# Patient Record
Sex: FEMALE | Race: OTHER | HISPANIC OR LATINO | URBAN - METROPOLITAN AREA
[De-identification: names, ages, dates, MRNs, and addresses within clinical notes are randomized per-mention and may not be internally consistent; named-entity substitution may affect disease eponyms.]

---

## 2019-10-13 ENCOUNTER — INPATIENT (INPATIENT)
Facility: HOSPITAL | Age: 64
LOS: 2 days | Discharge: HOME | DRG: 392 | End: 2019-10-16
Attending: INTERNAL MEDICINE | Admitting: INTERNAL MEDICINE
Payer: COMMERCIAL

## 2019-10-13 VITALS
SYSTOLIC BLOOD PRESSURE: 156 MMHG | HEART RATE: 91 BPM | WEIGHT: 139.99 LBS | RESPIRATION RATE: 20 BRPM | TEMPERATURE: 98 F | HEIGHT: 62 IN | OXYGEN SATURATION: 97 % | DIASTOLIC BLOOD PRESSURE: 85 MMHG

## 2019-10-13 DIAGNOSIS — J98.11 ATELECTASIS: ICD-10-CM

## 2019-10-13 DIAGNOSIS — E03.9 HYPOTHYROIDISM, UNSPECIFIED: ICD-10-CM

## 2019-10-13 DIAGNOSIS — E78.5 HYPERLIPIDEMIA, UNSPECIFIED: ICD-10-CM

## 2019-10-13 DIAGNOSIS — Z98.51 TUBAL LIGATION STATUS: Chronic | ICD-10-CM

## 2019-10-13 DIAGNOSIS — K57.92 DIVERTICULITIS OF INTESTINE, PART UNSPECIFIED, WITHOUT PERFORATION OR ABSCESS WITHOUT BLEEDING: ICD-10-CM

## 2019-10-13 DIAGNOSIS — E11.9 TYPE 2 DIABETES MELLITUS WITHOUT COMPLICATIONS: ICD-10-CM

## 2019-10-13 DIAGNOSIS — Z29.9 ENCOUNTER FOR PROPHYLACTIC MEASURES, UNSPECIFIED: ICD-10-CM

## 2019-10-13 DIAGNOSIS — I10 ESSENTIAL (PRIMARY) HYPERTENSION: ICD-10-CM

## 2019-10-13 DIAGNOSIS — Z41.1 ENCOUNTER FOR COSMETIC SURGERY: Chronic | ICD-10-CM

## 2019-10-13 LAB
ALBUMIN SERPL ELPH-MCNC: 3.6 G/DL — SIGNIFICANT CHANGE UP (ref 3.5–5)
ALP SERPL-CCNC: 102 U/L — SIGNIFICANT CHANGE UP (ref 40–120)
ALT FLD-CCNC: 26 U/L DA — SIGNIFICANT CHANGE UP (ref 10–60)
ANION GAP SERPL CALC-SCNC: 6 MMOL/L — SIGNIFICANT CHANGE UP (ref 5–17)
APPEARANCE UR: CLEAR — SIGNIFICANT CHANGE UP
AST SERPL-CCNC: 23 U/L — SIGNIFICANT CHANGE UP (ref 10–40)
BASOPHILS # BLD AUTO: 0.05 K/UL — SIGNIFICANT CHANGE UP (ref 0–0.2)
BASOPHILS NFR BLD AUTO: 0.4 % — SIGNIFICANT CHANGE UP (ref 0–2)
BILIRUB SERPL-MCNC: 0.5 MG/DL — SIGNIFICANT CHANGE UP (ref 0.2–1.2)
BILIRUB UR-MCNC: NEGATIVE — SIGNIFICANT CHANGE UP
BUN SERPL-MCNC: 11 MG/DL — SIGNIFICANT CHANGE UP (ref 7–18)
CALCIUM SERPL-MCNC: 9.1 MG/DL — SIGNIFICANT CHANGE UP (ref 8.4–10.5)
CHLORIDE SERPL-SCNC: 102 MMOL/L — SIGNIFICANT CHANGE UP (ref 96–108)
CO2 SERPL-SCNC: 27 MMOL/L — SIGNIFICANT CHANGE UP (ref 22–31)
COLOR SPEC: YELLOW — SIGNIFICANT CHANGE UP
CREAT SERPL-MCNC: 0.84 MG/DL — SIGNIFICANT CHANGE UP (ref 0.5–1.3)
DIFF PNL FLD: ABNORMAL
EOSINOPHIL # BLD AUTO: 0.04 K/UL — SIGNIFICANT CHANGE UP (ref 0–0.5)
EOSINOPHIL NFR BLD AUTO: 0.3 % — SIGNIFICANT CHANGE UP (ref 0–6)
GLUCOSE BLDC GLUCOMTR-MCNC: 108 MG/DL — HIGH (ref 70–99)
GLUCOSE BLDC GLUCOMTR-MCNC: 109 MG/DL — HIGH (ref 70–99)
GLUCOSE BLDC GLUCOMTR-MCNC: 128 MG/DL — HIGH (ref 70–99)
GLUCOSE SERPL-MCNC: 135 MG/DL — HIGH (ref 70–99)
GLUCOSE UR QL: NEGATIVE — SIGNIFICANT CHANGE UP
HCT VFR BLD CALC: 42.8 % — SIGNIFICANT CHANGE UP (ref 34.5–45)
HGB BLD-MCNC: 13.9 G/DL — SIGNIFICANT CHANGE UP (ref 11.5–15.5)
IMM GRANULOCYTES NFR BLD AUTO: 0.4 % — SIGNIFICANT CHANGE UP (ref 0–1.5)
KETONES UR-MCNC: NEGATIVE — SIGNIFICANT CHANGE UP
LEUKOCYTE ESTERASE UR-ACNC: ABNORMAL
LIDOCAIN IGE QN: 124 U/L — SIGNIFICANT CHANGE UP (ref 73–393)
LYMPHOCYTES # BLD AUTO: 0.98 K/UL — LOW (ref 1–3.3)
LYMPHOCYTES # BLD AUTO: 8.1 % — LOW (ref 13–44)
MCHC RBC-ENTMCNC: 28.4 PG — SIGNIFICANT CHANGE UP (ref 27–34)
MCHC RBC-ENTMCNC: 32.5 GM/DL — SIGNIFICANT CHANGE UP (ref 32–36)
MCV RBC AUTO: 87.3 FL — SIGNIFICANT CHANGE UP (ref 80–100)
MONOCYTES # BLD AUTO: 0.67 K/UL — SIGNIFICANT CHANGE UP (ref 0–0.9)
MONOCYTES NFR BLD AUTO: 5.6 % — SIGNIFICANT CHANGE UP (ref 2–14)
NEUTROPHILS # BLD AUTO: 10.25 K/UL — HIGH (ref 1.8–7.4)
NEUTROPHILS NFR BLD AUTO: 85.2 % — HIGH (ref 43–77)
NITRITE UR-MCNC: NEGATIVE — SIGNIFICANT CHANGE UP
NRBC # BLD: 0 /100 WBCS — SIGNIFICANT CHANGE UP (ref 0–0)
PH UR: 7 — SIGNIFICANT CHANGE UP (ref 5–8)
PLATELET # BLD AUTO: 400 K/UL — SIGNIFICANT CHANGE UP (ref 150–400)
POTASSIUM SERPL-MCNC: 4.3 MMOL/L — SIGNIFICANT CHANGE UP (ref 3.5–5.3)
POTASSIUM SERPL-SCNC: 4.3 MMOL/L — SIGNIFICANT CHANGE UP (ref 3.5–5.3)
PROT SERPL-MCNC: 8.3 G/DL — SIGNIFICANT CHANGE UP (ref 6–8.3)
PROT UR-MCNC: NEGATIVE — SIGNIFICANT CHANGE UP
RBC # BLD: 4.9 M/UL — SIGNIFICANT CHANGE UP (ref 3.8–5.2)
RBC # FLD: 12.5 % — SIGNIFICANT CHANGE UP (ref 10.3–14.5)
SODIUM SERPL-SCNC: 135 MMOL/L — SIGNIFICANT CHANGE UP (ref 135–145)
SP GR SPEC: 1 — LOW (ref 1.01–1.02)
UROBILINOGEN FLD QL: NEGATIVE — SIGNIFICANT CHANGE UP
WBC # BLD: 12.04 K/UL — HIGH (ref 3.8–10.5)
WBC # FLD AUTO: 12.04 K/UL — HIGH (ref 3.8–10.5)

## 2019-10-13 PROCEDURE — 74177 CT ABD & PELVIS W/CONTRAST: CPT | Mod: 26

## 2019-10-13 PROCEDURE — 99285 EMERGENCY DEPT VISIT HI MDM: CPT

## 2019-10-13 RX ORDER — METRONIDAZOLE 500 MG
500 TABLET ORAL ONCE
Refills: 0 | Status: COMPLETED | OUTPATIENT
Start: 2019-10-13 | End: 2019-10-13

## 2019-10-13 RX ORDER — ONDANSETRON 8 MG/1
4 TABLET, FILM COATED ORAL ONCE
Refills: 0 | Status: COMPLETED | OUTPATIENT
Start: 2019-10-13 | End: 2019-10-13

## 2019-10-13 RX ORDER — SODIUM CHLORIDE 9 MG/ML
1000 INJECTION, SOLUTION INTRAVENOUS
Refills: 0 | Status: DISCONTINUED | OUTPATIENT
Start: 2019-10-13 | End: 2019-10-16

## 2019-10-13 RX ORDER — KETOROLAC TROMETHAMINE 30 MG/ML
15 SYRINGE (ML) INJECTION EVERY 8 HOURS
Refills: 0 | Status: DISCONTINUED | OUTPATIENT
Start: 2019-10-13 | End: 2019-10-16

## 2019-10-13 RX ORDER — INSULIN LISPRO 100/ML
VIAL (ML) SUBCUTANEOUS
Refills: 0 | Status: DISCONTINUED | OUTPATIENT
Start: 2019-10-13 | End: 2019-10-16

## 2019-10-13 RX ORDER — ONDANSETRON 8 MG/1
4 TABLET, FILM COATED ORAL EVERY 6 HOURS
Refills: 0 | Status: DISCONTINUED | OUTPATIENT
Start: 2019-10-13 | End: 2019-10-16

## 2019-10-13 RX ORDER — LOSARTAN POTASSIUM 100 MG/1
50 TABLET, FILM COATED ORAL DAILY
Refills: 0 | Status: DISCONTINUED | OUTPATIENT
Start: 2019-10-13 | End: 2019-10-16

## 2019-10-13 RX ORDER — ENOXAPARIN SODIUM 100 MG/ML
40 INJECTION SUBCUTANEOUS DAILY
Refills: 0 | Status: DISCONTINUED | OUTPATIENT
Start: 2019-10-13 | End: 2019-10-16

## 2019-10-13 RX ORDER — ATORVASTATIN CALCIUM 80 MG/1
20 TABLET, FILM COATED ORAL AT BEDTIME
Refills: 0 | Status: DISCONTINUED | OUTPATIENT
Start: 2019-10-13 | End: 2019-10-16

## 2019-10-13 RX ORDER — IOHEXOL 300 MG/ML
30 INJECTION, SOLUTION INTRAVENOUS ONCE
Refills: 0 | Status: COMPLETED | OUTPATIENT
Start: 2019-10-13 | End: 2019-10-13

## 2019-10-13 RX ORDER — LEVOTHYROXINE SODIUM 125 MCG
1 TABLET ORAL
Qty: 0 | Refills: 0 | DISCHARGE

## 2019-10-13 RX ORDER — CIPROFLOXACIN LACTATE 400MG/40ML
VIAL (ML) INTRAVENOUS
Refills: 0 | Status: DISCONTINUED | OUTPATIENT
Start: 2019-10-13 | End: 2019-10-16

## 2019-10-13 RX ORDER — METRONIDAZOLE 500 MG
TABLET ORAL
Refills: 0 | Status: DISCONTINUED | OUTPATIENT
Start: 2019-10-13 | End: 2019-10-16

## 2019-10-13 RX ORDER — LEVOTHYROXINE SODIUM 125 MCG
100 TABLET ORAL DAILY
Refills: 0 | Status: DISCONTINUED | OUTPATIENT
Start: 2019-10-13 | End: 2019-10-16

## 2019-10-13 RX ORDER — MORPHINE SULFATE 50 MG/1
4 CAPSULE, EXTENDED RELEASE ORAL ONCE
Refills: 0 | Status: DISCONTINUED | OUTPATIENT
Start: 2019-10-13 | End: 2019-10-13

## 2019-10-13 RX ORDER — DEXTROSE 50 % IN WATER 50 %
25 SYRINGE (ML) INTRAVENOUS ONCE
Refills: 0 | Status: DISCONTINUED | OUTPATIENT
Start: 2019-10-13 | End: 2019-10-16

## 2019-10-13 RX ORDER — SODIUM CHLORIDE 9 MG/ML
1000 INJECTION INTRAMUSCULAR; INTRAVENOUS; SUBCUTANEOUS ONCE
Refills: 0 | Status: COMPLETED | OUTPATIENT
Start: 2019-10-13 | End: 2019-10-13

## 2019-10-13 RX ORDER — PIPERACILLIN AND TAZOBACTAM 4; .5 G/20ML; G/20ML
3.38 INJECTION, POWDER, LYOPHILIZED, FOR SOLUTION INTRAVENOUS ONCE
Refills: 0 | Status: COMPLETED | OUTPATIENT
Start: 2019-10-13 | End: 2019-10-13

## 2019-10-13 RX ORDER — CIPROFLOXACIN LACTATE 400MG/40ML
400 VIAL (ML) INTRAVENOUS ONCE
Refills: 0 | Status: COMPLETED | OUTPATIENT
Start: 2019-10-13 | End: 2019-10-13

## 2019-10-13 RX ORDER — METRONIDAZOLE 500 MG
500 TABLET ORAL EVERY 8 HOURS
Refills: 0 | Status: DISCONTINUED | OUTPATIENT
Start: 2019-10-13 | End: 2019-10-16

## 2019-10-13 RX ORDER — CIPROFLOXACIN LACTATE 400MG/40ML
400 VIAL (ML) INTRAVENOUS EVERY 12 HOURS
Refills: 0 | Status: DISCONTINUED | OUTPATIENT
Start: 2019-10-14 | End: 2019-10-16

## 2019-10-13 RX ORDER — LEVOTHYROXINE SODIUM 125 MCG
75 TABLET ORAL DAILY
Refills: 0 | Status: DISCONTINUED | OUTPATIENT
Start: 2019-10-13 | End: 2019-10-13

## 2019-10-13 RX ORDER — ACETAMINOPHEN 500 MG
650 TABLET ORAL EVERY 6 HOURS
Refills: 0 | Status: DISCONTINUED | OUTPATIENT
Start: 2019-10-13 | End: 2019-10-16

## 2019-10-13 RX ADMIN — IOHEXOL 30 MILLILITER(S): 300 INJECTION, SOLUTION INTRAVENOUS at 08:39

## 2019-10-13 RX ADMIN — Medication 15 MILLIGRAM(S): at 21:03

## 2019-10-13 RX ADMIN — LOSARTAN POTASSIUM 50 MILLIGRAM(S): 100 TABLET, FILM COATED ORAL at 15:49

## 2019-10-13 RX ADMIN — Medication 100 MILLIGRAM(S): at 21:53

## 2019-10-13 RX ADMIN — Medication 100 MILLIGRAM(S): at 14:37

## 2019-10-13 RX ADMIN — MORPHINE SULFATE 4 MILLIGRAM(S): 50 CAPSULE, EXTENDED RELEASE ORAL at 09:30

## 2019-10-13 RX ADMIN — SODIUM CHLORIDE 80 MILLILITER(S): 9 INJECTION, SOLUTION INTRAVENOUS at 21:48

## 2019-10-13 RX ADMIN — Medication 650 MILLIGRAM(S): at 16:19

## 2019-10-13 RX ADMIN — ENOXAPARIN SODIUM 40 MILLIGRAM(S): 100 INJECTION SUBCUTANEOUS at 15:50

## 2019-10-13 RX ADMIN — SODIUM CHLORIDE 1000 MILLILITER(S): 9 INJECTION INTRAMUSCULAR; INTRAVENOUS; SUBCUTANEOUS at 08:41

## 2019-10-13 RX ADMIN — Medication 650 MILLIGRAM(S): at 15:49

## 2019-10-13 RX ADMIN — Medication 100 MICROGRAM(S): at 15:49

## 2019-10-13 RX ADMIN — Medication 15 MILLIGRAM(S): at 21:20

## 2019-10-13 RX ADMIN — PIPERACILLIN AND TAZOBACTAM 200 GRAM(S): 4; .5 INJECTION, POWDER, LYOPHILIZED, FOR SOLUTION INTRAVENOUS at 11:38

## 2019-10-13 RX ADMIN — ATORVASTATIN CALCIUM 20 MILLIGRAM(S): 80 TABLET, FILM COATED ORAL at 21:53

## 2019-10-13 RX ADMIN — MORPHINE SULFATE 4 MILLIGRAM(S): 50 CAPSULE, EXTENDED RELEASE ORAL at 08:39

## 2019-10-13 RX ADMIN — Medication 200 MILLIGRAM(S): at 12:58

## 2019-10-13 RX ADMIN — ONDANSETRON 4 MILLIGRAM(S): 8 TABLET, FILM COATED ORAL at 08:39

## 2019-10-13 NOTE — CONSULT NOTE ADULT - NEGATIVE NEUROLOGICAL SYMPTOMS
no headache/no confusion/no vertigo/no loss of consciousness/no loss of sensation/no difficulty walking/no tremors/no syncope

## 2019-10-13 NOTE — H&P ADULT - PROBLEM SELECTOR PLAN 1
-p/w LLQ pain, failed Augmentin in OP setting   -WBC; 12K, BP: 150s/80s, afebrile   -CT abdomen: There is colonic diverticulosis. Adjacent to a diverticulum in the   region of the distal transverse colon/splenic flexure there are inflammatory changes in the adjacent fat and mild colonic wall thickening in this region. Findings are compatible with diverticulitis  - ED course: s/p Zosyn x1, 1L NS   -C/w cipro and Flagyl for now   -NPO with gentle IV hydration, advance to clear diet as tolerated   - Pain control   -Zofran PRn -p/w LLQ pain, failed Augmentin in OP setting   -WBC; 12K, BP: 150s/80s, afebrile   -CT abdomen: There is colonic diverticulosis. Adjacent to a diverticulum in the   region of the distal transverse colon/splenic flexure there are inflammatory changes in the adjacent fat and mild colonic wall thickening in this region. Findings are compatible with diverticulitis  - ED course: s/p Zosyn x1, 1L NS   -C/w cipro and Flagyl for now   -NPO with gentle IV hydration, advance to clear diet as tolerated   - Pain control   -Zofran PRn  -Blood culture   -Dr Lin consulted for GI

## 2019-10-13 NOTE — H&P ADULT - ASSESSMENT
65 yo F with PMHx of HTN, HLD, Hypothyroidism, DM presented to the ED w/ LLQ abd pain since Monday.     Admitted for Acute diverticulitis

## 2019-10-13 NOTE — H&P ADULT - HISTORY OF PRESENT ILLNESS
65 y/o F patient w/ PMHx of HTN, HLD, Hypothyroidism, DM, and Diverticulitis presents to the ED w/ LLQ abd pain w/ associated nausea and vomiting this morning (10/13/2019). Patient reports she has been takes Augmentin BID for x3 days ago. Patient denies fever, chest pain, or cough. Patient denies any prior abd surgeries. NKDA. 63 yo F with PMHx of HTN, HLD, Hypothyroidism, DM presented to the ED w/ LLQ abd pain since Monday. She went to see PCP and was prescribed Augmentin on Wednesday with minimal relief of her symptoms. Pain is present in LLQ of abdomen, constant sharp, non radiating, worse to 9/10 in severity last night associated with nausea and 2 episodes of NBNB vomiting since last night. Denies fever, diarrhea.

## 2019-10-13 NOTE — ED PROVIDER NOTE - CLINICAL SUMMARY MEDICAL DECISION MAKING FREE TEXT BOX
65 y/o F patient presents to the ED w/ LLQ pain. Patient notes pain feels like prior Diverticulitis. Patient has had x2-x3 CAT scans in the past. Worsening pain. Will get labs, CT, will give pain meds, and reassess.

## 2019-10-13 NOTE — CONSULT NOTE ADULT - NEGATIVE ENMT SYMPTOMS
no vertigo/no gum bleeding/no hearing difficulty/no dry mouth/no dysphagia/no nose bleeds/no ear pain/no throat pain

## 2019-10-13 NOTE — H&P ADULT - NSHPREVIEWOFSYSTEMS_GEN_ALL_CORE
REVIEW OF SYSTEMS:  CONSTITUTIONAL: No fever,   EYES: no acute visual disturbances  NECK: No pain or stiffness  RESPIRATORY: No cough; No shortness of breath  CARDIOVASCULAR: No chest pain, no palpitations  GASTROINTESTINAL: as above   NEUROLOGICAL: No headache or numbness, no tremors  MUSCULOSKELETAL: No joint pain, no muscle pain  GENITOURINARY: no dysuria, no frequency, no hesitancy  PSYCHIATRY: no depression , no anxiety  ALL OTHER  ROS negative

## 2019-10-13 NOTE — ED PROVIDER NOTE - PROGRESS NOTE DETAILS
noted wbc 12, given not improving on outpt oral abx w worsening sx, age, length of diverticulitis, will admit for cont'd mgt.

## 2019-10-13 NOTE — ED PROVIDER NOTE - OBJECTIVE STATEMENT
63 y/o F patient w/ PMHx of HTN, HLD, Hypothyroidism, DM, and Diverticulitis presents to the ED w/ LLQ abd pain w/ associated nausea and vomiting this morning (10/13/2019). Patient notes she has difficulty breathing secondary to pain. Patient reports she has been takes Amoxicillin for x3 days ago. Patient denies fever, chest pain, or cough. Patient denies any prior abd surgeries. NKDA. 65 y/o F patient w/ PMHx of HTN, HLD, Hypothyroidism, DM, and Diverticulitis presents to the ED w/ LLQ abd pain w/ associated nausea and vomiting this morning (10/13/2019). Patient reports she has been takes Augmentin BID for x3 days ago. Patient denies fever, chest pain, or cough. Patient denies any prior abd surgeries. NKDA.

## 2019-10-13 NOTE — ED ADULT NURSE NOTE - ED STAT RN HANDOFF DETAILS
Received patient from JUNIOR Lemus admitted to medicine [place] speaking in no acute distress ,c/o LLQ pain on pain scale 4/10. Patient made aware if pain becomes worse to make staff aware. Oriented to unit placed on bed awaiting bed assignment continue to monitor patient.

## 2019-10-13 NOTE — CONSULT NOTE ADULT - ASSESSMENT
1. Abdominal pain  2. Acute diverticulitis  3. Constipation    Suggestions:    1. Antibiotics IV  2. NPO  3. IVF hydration  4. Monitor electrolytes  5. Surgical evaluation  6. Colonoscopy in 6-8 weeks out patient  7. GI and DVT prophylaxis

## 2019-10-13 NOTE — H&P ADULT - PROBLEM SELECTOR PLAN 6
IMPROVE VTE Individual Risk Assessment  RISK                                                                Points  [  ] Previous VTE                                                  3  [  ] Thrombophilia                                               2  [  ] Lower limb paralysis                                      2        (unable to hold up >15 seconds)    [  ] Current Cancer                                              2         (within 6 months)  [x  ] Immobilization > 24 hrs                                1  [  ] ICU/CCU stay > 24 hours                              1  [x  ] Age > 60                                                      1  IMPROVE VTE Score _________2, Lovenox  for DVT proph

## 2019-10-13 NOTE — H&P ADULT - ATTENDING COMMENTS
65 yo F with PMHx of HTN, HLD, Hypothyroidism, DM presented to the ED w/ LLQ abd pain since Monday. She went to see PCP and was prescribed Augmentin on Wednesday with minimal relief of her symptoms. Pain is present in LLQ of abdomen, constant sharp, non radiating, worse to 9/10 in severity last night associated with nausea and 2 episodes of NBNB vomiting since last night. Denies fever, diarrhea.       pt seen in bed, vitals stable, physical exam reveals lungs cta b/l, heart s1s2, abd soft nd, bs+, + tender LLQ, ext no edema. labs and diagnostic test result reviewed.    assessment  -- acute diverticulitis, h/o htn, hld, dm, hypothyroid     plan  --  admit to med, cipro, flagyl, pain control, npo, cont preadmit home meds, gi and dvt profilaxis,  cbc, bmp, mg, phos, lipids, tsh, bld cx, ua, blood cx,     GI cons

## 2019-10-14 LAB
24R-OH-CALCIDIOL SERPL-MCNC: 37.8 NG/ML — SIGNIFICANT CHANGE UP (ref 30–80)
ALBUMIN SERPL ELPH-MCNC: 2.9 G/DL — LOW (ref 3.5–5)
ALP SERPL-CCNC: 76 U/L — SIGNIFICANT CHANGE UP (ref 40–120)
ALT FLD-CCNC: 17 U/L DA — SIGNIFICANT CHANGE UP (ref 10–60)
ANION GAP SERPL CALC-SCNC: 8 MMOL/L — SIGNIFICANT CHANGE UP (ref 5–17)
AST SERPL-CCNC: 13 U/L — SIGNIFICANT CHANGE UP (ref 10–40)
BASOPHILS # BLD AUTO: 0.05 K/UL — SIGNIFICANT CHANGE UP (ref 0–0.2)
BASOPHILS NFR BLD AUTO: 0.5 % — SIGNIFICANT CHANGE UP (ref 0–2)
BILIRUB SERPL-MCNC: 0.5 MG/DL — SIGNIFICANT CHANGE UP (ref 0.2–1.2)
BUN SERPL-MCNC: 7 MG/DL — SIGNIFICANT CHANGE UP (ref 7–18)
CALCIUM SERPL-MCNC: 8.4 MG/DL — SIGNIFICANT CHANGE UP (ref 8.4–10.5)
CHLORIDE SERPL-SCNC: 107 MMOL/L — SIGNIFICANT CHANGE UP (ref 96–108)
CHOLEST SERPL-MCNC: 158 MG/DL — SIGNIFICANT CHANGE UP (ref 10–199)
CO2 SERPL-SCNC: 26 MMOL/L — SIGNIFICANT CHANGE UP (ref 22–31)
CREAT SERPL-MCNC: 0.81 MG/DL — SIGNIFICANT CHANGE UP (ref 0.5–1.3)
EOSINOPHIL # BLD AUTO: 0.15 K/UL — SIGNIFICANT CHANGE UP (ref 0–0.5)
EOSINOPHIL NFR BLD AUTO: 1.6 % — SIGNIFICANT CHANGE UP (ref 0–6)
FOLATE SERPL-MCNC: 16.7 NG/ML — SIGNIFICANT CHANGE UP
GLUCOSE BLDC GLUCOMTR-MCNC: 101 MG/DL — HIGH (ref 70–99)
GLUCOSE BLDC GLUCOMTR-MCNC: 134 MG/DL — HIGH (ref 70–99)
GLUCOSE BLDC GLUCOMTR-MCNC: 84 MG/DL — SIGNIFICANT CHANGE UP (ref 70–99)
GLUCOSE BLDC GLUCOMTR-MCNC: 97 MG/DL — SIGNIFICANT CHANGE UP (ref 70–99)
GLUCOSE SERPL-MCNC: 162 MG/DL — HIGH (ref 70–99)
HBA1C BLD-MCNC: 6.3 % — HIGH (ref 4–5.6)
HCT VFR BLD CALC: 37.4 % — SIGNIFICANT CHANGE UP (ref 34.5–45)
HCV AB S/CO SERPL IA: 0.14 S/CO — SIGNIFICANT CHANGE UP (ref 0–0.99)
HCV AB SERPL-IMP: SIGNIFICANT CHANGE UP
HDLC SERPL-MCNC: 54 MG/DL — SIGNIFICANT CHANGE UP
HGB BLD-MCNC: 11.9 G/DL — SIGNIFICANT CHANGE UP (ref 11.5–15.5)
IMM GRANULOCYTES NFR BLD AUTO: 0.2 % — SIGNIFICANT CHANGE UP (ref 0–1.5)
LIPID PNL WITH DIRECT LDL SERPL: 86 MG/DL — SIGNIFICANT CHANGE UP
LYMPHOCYTES # BLD AUTO: 1.63 K/UL — SIGNIFICANT CHANGE UP (ref 1–3.3)
LYMPHOCYTES # BLD AUTO: 17.5 % — SIGNIFICANT CHANGE UP (ref 13–44)
MAGNESIUM SERPL-MCNC: 2.4 MG/DL — SIGNIFICANT CHANGE UP (ref 1.6–2.6)
MCHC RBC-ENTMCNC: 28.1 PG — SIGNIFICANT CHANGE UP (ref 27–34)
MCHC RBC-ENTMCNC: 31.8 GM/DL — LOW (ref 32–36)
MCV RBC AUTO: 88.4 FL — SIGNIFICANT CHANGE UP (ref 80–100)
MONOCYTES # BLD AUTO: 0.7 K/UL — SIGNIFICANT CHANGE UP (ref 0–0.9)
MONOCYTES NFR BLD AUTO: 7.5 % — SIGNIFICANT CHANGE UP (ref 2–14)
NEUTROPHILS # BLD AUTO: 6.75 K/UL — SIGNIFICANT CHANGE UP (ref 1.8–7.4)
NEUTROPHILS NFR BLD AUTO: 72.7 % — SIGNIFICANT CHANGE UP (ref 43–77)
NRBC # BLD: 0 /100 WBCS — SIGNIFICANT CHANGE UP (ref 0–0)
PHOSPHATE SERPL-MCNC: 3 MG/DL — SIGNIFICANT CHANGE UP (ref 2.5–4.5)
PLATELET # BLD AUTO: 334 K/UL — SIGNIFICANT CHANGE UP (ref 150–400)
POTASSIUM SERPL-MCNC: 4.1 MMOL/L — SIGNIFICANT CHANGE UP (ref 3.5–5.3)
POTASSIUM SERPL-SCNC: 4.1 MMOL/L — SIGNIFICANT CHANGE UP (ref 3.5–5.3)
PROT SERPL-MCNC: 6.6 G/DL — SIGNIFICANT CHANGE UP (ref 6–8.3)
RBC # BLD: 4.23 M/UL — SIGNIFICANT CHANGE UP (ref 3.8–5.2)
RBC # FLD: 12.7 % — SIGNIFICANT CHANGE UP (ref 10.3–14.5)
SODIUM SERPL-SCNC: 141 MMOL/L — SIGNIFICANT CHANGE UP (ref 135–145)
TOTAL CHOLESTEROL/HDL RATIO MEASUREMENT: 2.9 RATIO — LOW (ref 3.3–7.1)
TRIGL SERPL-MCNC: 92 MG/DL — SIGNIFICANT CHANGE UP (ref 10–149)
TSH SERPL-MCNC: 0.62 UU/ML — SIGNIFICANT CHANGE UP (ref 0.34–4.82)
VIT B12 SERPL-MCNC: 334 PG/ML — SIGNIFICANT CHANGE UP (ref 232–1245)
WBC # BLD: 9.3 K/UL — SIGNIFICANT CHANGE UP (ref 3.8–10.5)
WBC # FLD AUTO: 9.3 K/UL — SIGNIFICANT CHANGE UP (ref 3.8–10.5)

## 2019-10-14 RX ORDER — INFLUENZA VIRUS VACCINE 15; 15; 15; 15 UG/.5ML; UG/.5ML; UG/.5ML; UG/.5ML
0.5 SUSPENSION INTRAMUSCULAR ONCE
Refills: 0 | Status: COMPLETED | OUTPATIENT
Start: 2019-10-14 | End: 2019-10-16

## 2019-10-14 RX ADMIN — Medication 200 MILLIGRAM(S): at 19:34

## 2019-10-14 RX ADMIN — ATORVASTATIN CALCIUM 20 MILLIGRAM(S): 80 TABLET, FILM COATED ORAL at 21:47

## 2019-10-14 RX ADMIN — Medication 200 MILLIGRAM(S): at 05:42

## 2019-10-14 RX ADMIN — ENOXAPARIN SODIUM 40 MILLIGRAM(S): 100 INJECTION SUBCUTANEOUS at 11:23

## 2019-10-14 RX ADMIN — Medication 100 MILLIGRAM(S): at 21:47

## 2019-10-14 RX ADMIN — Medication 100 MILLIGRAM(S): at 14:50

## 2019-10-14 RX ADMIN — Medication 100 MICROGRAM(S): at 05:42

## 2019-10-14 RX ADMIN — LOSARTAN POTASSIUM 50 MILLIGRAM(S): 100 TABLET, FILM COATED ORAL at 05:42

## 2019-10-14 RX ADMIN — Medication 100 MILLIGRAM(S): at 05:43

## 2019-10-14 NOTE — CONSULT NOTE ADULT - GASTROINTESTINAL DETAILS
soft/no rebound tenderness/no rigidity/no guarding/no distention/bowel sounds normal/nontender
no distention/no masses palpable/no guarding/no rebound tenderness/no rigidity/soft

## 2019-10-14 NOTE — CONSULT NOTE ADULT - CONSTITUTIONAL DETAILS
well-groomed/no distress/well-nourished/well-developed
well-developed/well-groomed/no distress/well-nourished

## 2019-10-14 NOTE — PROGRESS NOTE ADULT - PROBLEM SELECTOR PLAN 2
panculture  antibiotics  GI consult noted   pain contro  NPOl. Check pending cultures  antibiotics  GI consult noted   pain control  Clear liquid

## 2019-10-14 NOTE — PROGRESS NOTE ADULT - SUBJECTIVE AND OBJECTIVE BOX
65 yo F with PMHx of HTN, HLD, Hypothyroidism, DM presented to the ED w/ LLQ abd pain since Monday. She went to see PCP and was prescribed Augmentin on Wednesday with minimal relief of her symptoms. Pain is present in LLQ of abdomen, constant sharp, non radiating, worse to 9/10 in severity last night associated with nausea and 2 episodes of NBNB vomiting since last night. Denies fever, diarrhea.         Review of Systems:  Review of Systems: REVIEW OF SYSTEMS:  CONSTITUTIONAL: No fever,   EYES: no acute visual disturbances  NECK: No pain or stiffness  RESPIRATORY: No cough; No shortness of breath  CARDIOVASCULAR: No chest pain, no palpitations  GASTROINTESTINAL: as above   NEUROLOGICAL: No headache or numbness, no tremors  MUSCULOSKELETAL: No joint pain, no muscle pain  GENITOURINARY: no dysuria, no frequency, no hesitancy  PSYCHIATRY: no depression , no anxiety ALL OTHER  ROS negative	      pt seen in ed [ x ], reg med floor [   ], bed [ x ], chair at bedside [   ], a+o x3 [ x ], lethargic [  ],  nad [ x ]      Allergies    No Known Allergies        Vitals    T(F): 98.6 (10-14-19 @ 07:41), Max: 99.1 (10-13-19 @ 19:10)  HR: 76 (10-14-19 @ 07:41) (72 - 80)  BP: 116/64 (10-14-19 @ 07:41) (112/59 - 138/65)  RR: 17 (10-14-19 @ 07:41) (16 - 20)  SpO2: 97% (10-14-19 @ 07:41) (96% - 99%)  Wt(kg): --  CAPILLARY BLOOD GLUCOSE      POCT Blood Glucose.: 134 mg/dL (14 Oct 2019 08:29)      Labs                          11.9   9.30  )-----------( 334      ( 14 Oct 2019 06:34 )             37.4       10-14    141  |  107  |  7   ----------------------------<  162<H>  4.1   |  26  |  0.81    Ca    8.4      14 Oct 2019 06:34  Phos  3.0     10-14  Mg     2.4     10-14    TPro  6.6  /  Alb  2.9<L>  /  TBili  0.5  /  DBili  x   /  AST  13  /  ALT  17  /  AlkPhos  76  10-14                Radiology Results      Meds    MEDICATIONS  (STANDING):  atorvastatin 20 milliGRAM(s) Oral at bedtime  ciprofloxacin   IVPB 400 milliGRAM(s) IV Intermittent every 12 hours  ciprofloxacin   IVPB      dextrose 5% + sodium chloride 0.9%. 1000 milliLiter(s) (80 mL/Hr) IV Continuous <Continuous>  dextrose 50% Injectable 25 Gram(s) IV Push once  enoxaparin Injectable 40 milliGRAM(s) SubCutaneous daily  insulin lispro (HumaLOG) corrective regimen sliding scale   SubCutaneous Before meals and at bedtime  levothyroxine 100 MICROGram(s) Oral daily  losartan 50 milliGRAM(s) Oral daily  metroNIDAZOLE  IVPB      metroNIDAZOLE  IVPB 500 milliGRAM(s) IV Intermittent every 8 hours      MEDICATIONS  (PRN):  acetaminophen   Tablet .. 650 milliGRAM(s) Oral every 6 hours PRN Temp greater or equal to 38C (100.4F), Mild Pain (1 - 3)  ketorolac   Injectable 15 milliGRAM(s) IV Push every 8 hours PRN Moderate Pain (4 - 6)  ondansetron Injectable 4 milliGRAM(s) IV Push every 6 hours PRN Nausea and/or Vomiting      Physical Exam    Neuro :  no focal deficits  Respiratory: CTA B/L  CV: RRR, S1S2, no murmurs,   Abdominal: Soft, NT, ND +BS,  Extremities: No edema, + peripheral pulses    ASSESSMENT    Diverticulitis of intestine without perforation or abscess without bleeding  h/o Diabetes  Hyperlipidemia  Hypothyroid  HTN (hypertension)  S/P rhinoplasty  S/P tubal ligation      PLAN      cont cipro and flagyl  gi cons noted  pt to have Colonoscopy in 6-8 weeks out patient   Surgical evaluation  pulm f/u  incentive spirometry  Bronchodilators  cont current meds 63 yo F with PMHx of HTN, HLD, Hypothyroidism, DM presented to the ED w/ LLQ abd pain since Monday. She went to see PCP and was prescribed Augmentin on Wednesday with minimal relief of her symptoms. Pain is present in LLQ of abdomen, constant sharp, non radiating, worse to 9/10 in severity last night associated with nausea and 2 episodes of NBNB vomiting since last night. Denies fever, diarrhea.         Review of Systems:  Review of Systems: REVIEW OF SYSTEMS:  CONSTITUTIONAL: No fever,   EYES: no acute visual disturbances  NECK: No pain or stiffness  RESPIRATORY: No cough; No shortness of breath  CARDIOVASCULAR: No chest pain, no palpitations  GASTROINTESTINAL: as above   NEUROLOGICAL: No headache or numbness, no tremors  MUSCULOSKELETAL: No joint pain, no muscle pain  GENITOURINARY: no dysuria, no frequency, no hesitancy  PSYCHIATRY: no depression , no anxiety ALL OTHER  ROS negative	      pt seen in ed [ x ], reg med floor [   ], bed [ x ], chair at bedside [   ], a+o x3 [ x ], lethargic [  ],  nad [ x ]      Allergies    No Known Allergies        Vitals    T(F): 98.6 (10-14-19 @ 07:41), Max: 99.1 (10-13-19 @ 19:10)  HR: 76 (10-14-19 @ 07:41) (72 - 80)  BP: 116/64 (10-14-19 @ 07:41) (112/59 - 138/65)  RR: 17 (10-14-19 @ 07:41) (16 - 20)  SpO2: 97% (10-14-19 @ 07:41) (96% - 99%)  Wt(kg): --  CAPILLARY BLOOD GLUCOSE      POCT Blood Glucose.: 134 mg/dL (14 Oct 2019 08:29)      Labs                          11.9   9.30  )-----------( 334      ( 14 Oct 2019 06:34 )             37.4       10-14    141  |  107  |  7   ----------------------------<  162<H>  4.1   |  26  |  0.81    Ca    8.4      14 Oct 2019 06:34  Phos  3.0     10-14  Mg     2.4     10-14    TPro  6.6  /  Alb  2.9<L>  /  TBili  0.5  /  DBili  x   /  AST  13  /  ALT  17  /  AlkPhos  76  10-14        Radiology Results      < from: CT Abdomen and Pelvis w/ Oral Cont and w/ IV Cont (10.13.19 @ 10:59) >  IMPRESSION: Findings compatible with diverticulitis involving the distal   transverse colon/splenic flexure.    < end of copied text >        Meds    MEDICATIONS  (STANDING):  atorvastatin 20 milliGRAM(s) Oral at bedtime  ciprofloxacin   IVPB 400 milliGRAM(s) IV Intermittent every 12 hours  ciprofloxacin   IVPB      dextrose 5% + sodium chloride 0.9%. 1000 milliLiter(s) (80 mL/Hr) IV Continuous <Continuous>  dextrose 50% Injectable 25 Gram(s) IV Push once  enoxaparin Injectable 40 milliGRAM(s) SubCutaneous daily  insulin lispro (HumaLOG) corrective regimen sliding scale   SubCutaneous Before meals and at bedtime  levothyroxine 100 MICROGram(s) Oral daily  losartan 50 milliGRAM(s) Oral daily  metroNIDAZOLE  IVPB      metroNIDAZOLE  IVPB 500 milliGRAM(s) IV Intermittent every 8 hours      MEDICATIONS  (PRN):  acetaminophen   Tablet .. 650 milliGRAM(s) Oral every 6 hours PRN Temp greater or equal to 38C (100.4F), Mild Pain (1 - 3)  ketorolac   Injectable 15 milliGRAM(s) IV Push every 8 hours PRN Moderate Pain (4 - 6)  ondansetron Injectable 4 milliGRAM(s) IV Push every 6 hours PRN Nausea and/or Vomiting      Physical Exam    Neuro :  no focal deficits  Respiratory: CTA B/L  CV: RRR, S1S2, no murmurs,   Abdominal: Soft, left abd tender to moderate palp, ND +BS,  Extremities: No edema, + peripheral pulses    ASSESSMENT    Diverticulitis of intestine without perforation or abscess without bleeding  h/o Diabetes  Hyperlipidemia  Hypothyroid  HTN (hypertension)  S/P rhinoplasty  S/P tubal ligation      PLAN      ct abd pelv with Findings compatible with diverticulitis involving the distal transverse colon/splenic flexure noted above   cont cipro and flagyl  gi cons noted  pt to have Colonoscopy in 6-8 weeks out patient   Surgical cons  cont clears  pulm f/u  incentive spirometry  Bronchodilators  cont current meds

## 2019-10-14 NOTE — PROGRESS NOTE ADULT - SUBJECTIVE AND OBJECTIVE BOX
[   ] ICU                                          [   ] CCU                                      [ X  ] Medical Floor      Patient is comfortable. No new complaints reported, No abdominal pain, N/V, hematemesis, hematochezia, melena, fever, chills, chest pain, SOB, cough or diarrhea reported.    VITALS  Vital Signs Last 24 Hrs  T(C): 36.7 (14 Oct 2019 16:39), Max: 37.3 (13 Oct 2019 19:10)  T(F): 98.1 (14 Oct 2019 16:39), Max: 99.1 (13 Oct 2019 19:10)  HR: 62 (14 Oct 2019 16:39) (62 - 77)  BP: 106/52 (14 Oct 2019 16:39) (100/58 - 125/68)   RR: 20 (14 Oct 2019 16:39) (16 - 20)  SpO2: 95% (14 Oct 2019 16:39) (95% - 98%)       MEDICATIONS  (STANDING):  atorvastatin 20 milliGRAM(s) Oral at bedtime  ciprofloxacin   IVPB 400 milliGRAM(s) IV Intermittent every 12 hours  ciprofloxacin   IVPB      dextrose 5% + sodium chloride 0.9%. 1000 milliLiter(s) (80 mL/Hr) IV Continuous <Continuous>  dextrose 50% Injectable 25 Gram(s) IV Push once  enoxaparin Injectable 40 milliGRAM(s) SubCutaneous daily  influenza   Vaccine 0.5 milliLiter(s) IntraMuscular once  insulin lispro (HumaLOG) corrective regimen sliding scale   SubCutaneous Before meals and at bedtime  levothyroxine 100 MICROGram(s) Oral daily  losartan 50 milliGRAM(s) Oral daily  metroNIDAZOLE  IVPB      metroNIDAZOLE  IVPB 500 milliGRAM(s) IV Intermittent every 8 hours    MEDICATIONS  (PRN):  acetaminophen   Tablet .. 650 milliGRAM(s) Oral every 6 hours PRN Temp greater or equal to 38C (100.4F), Mild Pain (1 - 3)  ketorolac   Injectable 15 milliGRAM(s) IV Push every 8 hours PRN Moderate Pain (4 - 6)  ondansetron Injectable 4 milliGRAM(s) IV Push every 6 hours PRN Nausea and/or Vomiting                            11.9   9.30  )-----------( 334      ( 14 Oct 2019 06:34 )             37.4       10-14    141  |  107  |  7   ----------------------------<  162<H>  4.1   |  26  |  0.81    Ca    8.4      14 Oct 2019 06:34  Phos  3.0     10-14  Mg     2.4     10-14    TPro  6.6  /  Alb  2.9<L>  /  TBili  0.5  /  DBili  x   /  AST  13  /  ALT  17  /  AlkPhos  76  10-14

## 2019-10-14 NOTE — CONSULT NOTE ADULT - ASSESSMENT
63 y/o Female w/ acute onset of diverticulitis, 4th episode     -Bowel rest   -IVF   -IV abx   -Pain medication PRN   -C/w Home Medication   -Care as per medical team   -Will follow   -D/w Dr Rice and agrees

## 2019-10-14 NOTE — CONSULT NOTE ADULT - NEGATIVE GASTROINTESTINAL SYMPTOMS
no vomiting/no diarrhea/no melena/no jaundice/no nausea/no hematochezia
no diarrhea/no change in bowel habits/no melena/no constipation

## 2019-10-14 NOTE — CONSULT NOTE ADULT - SUBJECTIVE AND OBJECTIVE BOX
[  ] STAT REQUEST              [ X ] ROUTINE REQUEST    Patient is a 64 year old female with LLQ abdominal pain. GI consulted to evaluate.        HPI:  64 year old female with multiple medical problems presented with one week history of continuos, sharp, 9/10 severity LUQ abdominal pain radiating to her LLQ associated with constipation.  Patient denies nausea, vomiting, hematemesis, hematochezia, melena, fever, chills, chest pain, SOB, cough, palpitation, cough, dysuria, hematuria or diarrhea.      PAIN MANAGEMENT:  Pain Scale:                9 /10  Pain Location:  LLQ abdominal pain    Prior Colonoscopy:  No prior colonoscopy    PAST MEDICAL HISTORY  DM  Hyperlipidemia  Hypothyroid  HTN        PAST SURGICAL HISTORY  Rhinoplasty  Tubal ligation      Allergies    No Known Allergies          MEDICATIONS  (STANDING):  atorvastatin 20 milliGRAM(s) Oral at bedtime  ciprofloxacin   IVPB      dextrose 5% + sodium chloride 0.9%. 1000 milliLiter(s) (80 mL/Hr) IV Continuous <Continuous>  dextrose 50% Injectable 25 Gram(s) IV Push once  enoxaparin Injectable 40 milliGRAM(s) SubCutaneous daily  insulin lispro (HumaLOG) corrective regimen sliding scale   SubCutaneous Before meals and at bedtime  levothyroxine 100 MICROGram(s) Oral daily  losartan 50 milliGRAM(s) Oral daily  metroNIDAZOLE  IVPB      metroNIDAZOLE  IVPB 500 milliGRAM(s) IV Intermittent once  metroNIDAZOLE  IVPB 500 milliGRAM(s) IV Intermittent every 8 hours    MEDICATIONS  (PRN):  acetaminophen   Tablet .. 650 milliGRAM(s) Oral every 6 hours PRN Temp greater or equal to 38C (100.4F), Mild Pain (1 - 3)  ketorolac   Injectable 15 milliGRAM(s) IV Push every 8 hours PRN Moderate Pain (4 - 6)  ondansetron Injectable 4 milliGRAM(s) IV Push every 6 hours PRN Nausea and/or Vomiting      SOCIAL HISTORY  Advanced Directives:       [ X ] Full Code       [  ] DNR  Marital Status:         [  ] M      [ X ] S      [  ] D       [  ] W  Children:       [ X ] Yes      [  ] No  Occupation:        [  ] Employed       [ X ] Unemployed       [  ] Retired  Diet:       [ X ] Regular       [  ] PEG feeding          [  ] NG tube feeding  Drug Use:           [ X ] Patient denied          [  ] Yes  Alcohol:           [ X ] No             [  ] Yes (socially)         [  ] Yes (chronic)  Tobacco:           [  ] Yes           [ X ] No    FAMILY HISTORY  [ X ] Heart Disease            [  ] Diabetes             [  ] HTN             [  ] Colon Cancer             [  ] Stomach Cancer              [  ] Pancreatic Cancer    VITAL SIGNS   Vital Signs Last 24 Hrs  T(C): 36.7 (13 Oct 2019 11:12), Max: 36.7 (13 Oct 2019 11:12)  T(F): 98 (13 Oct 2019 11:12), Max: 98 (13 Oct 2019 11:12)  HR: 73 (13 Oct 2019 11:12) (73 - 91)  BP: 136/58 (13 Oct 2019 11:12) (136/58 - 156/85)   RR: 20 (13 Oct 2019 11:12) (20 - 20)  SpO2: 98% (13 Oct 2019 11:12) (97% - 98%)  Daily Height in cm: 157.48 (13 Oct 2019 07:44)          CBC Full  -  ( 13 Oct 2019 08:45 )  WBC Count : 12.04 K/uL  RBC Count : 4.90 M/uL  Hemoglobin : 13.9 g/dL  Hematocrit : 42.8 %  Platelet Count - Automated : 400 K/uL  Mean Cell Volume : 87.3 fl  Mean Cell Hemoglobin : 28.4 pg  Mean Cell Hemoglobin Concentration : 32.5 gm/dL  Auto Neutrophil # : 10.25 K/uL  Auto Lymphocyte # : 0.98 K/uL  Auto Monocyte # : 0.67 K/uL  Auto Eosinophil # : 0.04 K/uL  Auto Basophil # : 0.05 K/uL  Auto Neutrophil % : 85.2 %  Auto Lymphocyte % : 8.1 %  Auto Monocyte % : 5.6 %  Auto Eosinophil % : 0.3 %  Auto Basophil % : 0.4 %      10-13    135  |  102  |  11  ----------------------------<  135<H>  4.3   |  27  |  0.84    Ca    9.1      13 Oct 2019 08:46    TPro  8.3  /  Alb  3.6  /  TBili  0.5  /  DBili  x   /  AST  23  /  ALT  26  /  AlkPhos  102  10-13    Lipase, Serum: 124 U/L (10-13 @ 08:46)       Urinalysis (10.13.19 @ 11:19)    Glucose Qualitative, Urine: Negative    Blood, Urine: Moderate    pH Urine: 7.0    Color: Yellow    Urine Appearance: Clear    Bilirubin: Negative    Ketone - Urine: Negative    Specific Gravity: 1.005    Protein, Urine: Negative    Urobilinogen: Negative    Nitrite: Negative    Leukocyte Esterase Concentration: Trace      ECG  Ventricular Rate 75 BPM    Atrial Rate 75 BPM    P-R Interval 166 ms    QRS Duration 80 ms     ms    QTc 453 ms    P Axis 42 degrees    R Axis 47 degrees    T Axis 48 degrees    Diagnosis Line Normal sinus rhythm  Normal ECG      RADIOLOGY/IMAGING                EXAM:  CT ABDOMEN AND PELVIS OC IC                  PROCEDURE DATE:  10/13/2019     INTERPRETATION:  Clinical information: Left lower quadrant pain.    Comparison: 11/29/2016 CT scan of the abdomen and pelvis    PROCEDURE:   CT of the Abdomen and Pelvis was performed with intravenous contrast.  100 ml of Omnipaque 350 was injected intravenously. None was discarded.  Oral contrast was administered.          FINDINGS:    LOWER CHEST: Subsegmental atelectasis at the lung bases.    ABDOMEN:  LIVER: within normal limits.  BILE DUCTS: normal caliber.  GALLBLADDER: No calcified gallstones. Normal caliber wall.  PANCREAS: within normal limits.  SPLEEN: within normal limits.  ADRENALS: within normal limits.  KIDNEYS: within normal limits.    PELVIS:  REPRODUCTIVE ORGANS: no pelvic masses.  URETERS: within normal limits.  BLADDER: within normal limits.      BOWEL: There is colonic diverticulosis. Adjacent to a diverticulum in the   region of the distal transverse colon/splenic flexure there are   inflammatory changes in the adjacent fat and mild colonic wall thickening   in this region. Findings are compatible with diverticulitis. There is no   bowel obstruction. No enlarged mesenteric lymph nodes.  PERITONEUM: no ascites or free air, no fluid collection.  VESSELS: within normal limits.  RETROPERITONEUM: within normal limits.    ABDOMINAL WALL: within normal limits.  BONES: Bilateral pars interarticularis defects at L5 with grade 1   spondylolisthesis of L5 on S1.     IMPRESSION: Findings compatible with diverticulitis involving the distal   transverse colon/splenic flexure.
PULMONARY CONSULT NOTE      BETH DIAZ  [id number]    Patient is a 64y old  Female who presents with a chief complaint of LLQ abdominal pain (13 Oct 2019 12:06)    History of Present Illness:  Reason for Admission: LLQ abdominal pain	  History of Present Illness: 	  65 yo F with PMHx of HTN, HLD, Hypothyroidism, DM presented to the ED w/ LLQ abd pain since Monday. She went to see PCP and was prescribed Augmentin on Wednesday with minimal relief of her symptoms. Pain is present in LLQ of abdomen, constant sharp, non radiating, worse to 9/10 in severity last night associated with nausea and 2 episodes of NBNB vomiting since last night. Denies fever, diarrhea.       HISTORY OF PRESENT ILLNESS: As above. Awake, alert, comfortable in bed in NAD    MEDICATIONS  (STANDING):  atorvastatin 20 milliGRAM(s) Oral at bedtime  ciprofloxacin   IVPB 400 milliGRAM(s) IV Intermittent once  ciprofloxacin   IVPB      dextrose 5% + sodium chloride 0.9%. 1000 milliLiter(s) (80 mL/Hr) IV Continuous <Continuous>  dextrose 50% Injectable 25 Gram(s) IV Push once  enoxaparin Injectable 40 milliGRAM(s) SubCutaneous daily  insulin lispro (HumaLOG) corrective regimen sliding scale   SubCutaneous Before meals and at bedtime  levothyroxine 100 MICROGram(s) Oral daily  losartan 50 milliGRAM(s) Oral daily  metroNIDAZOLE  IVPB      metroNIDAZOLE  IVPB 500 milliGRAM(s) IV Intermittent once  metroNIDAZOLE  IVPB 500 milliGRAM(s) IV Intermittent every 8 hours      MEDICATIONS  (PRN):  acetaminophen   Tablet .. 650 milliGRAM(s) Oral every 6 hours PRN Temp greater or equal to 38C (100.4F), Mild Pain (1 - 3)  ketorolac   Injectable 15 milliGRAM(s) IV Push every 8 hours PRN Moderate Pain (4 - 6)  ondansetron Injectable 4 milliGRAM(s) IV Push every 6 hours PRN Nausea and/or Vomiting      Allergies    No Known Allergies    Intolerances        PAST MEDICAL & SURGICAL HISTORY:  Diabetes  Hyperlipidemia  Hypothyroid  HTN (hypertension)  S/P rhinoplasty  S/P tubal ligation      FAMILY HISTORY:  No pertinent family history in first degree relatives      SOCIAL HISTORY  Smoking History:     REVIEW OF SYSTEMS:    CONSTITUTIONAL:  No fevers, chills, sweats    HEENT:  Eyes:  No diplopia or blurred vision. ENT:  No earache, sore throat or runny nose.    CARDIOVASCULAR:  No pressure, squeezing, tightness, or heaviness about the chest; no palpitations.    RESPIRATORY:  Per HPI    GASTROINTESTINAL:  + abdominal pain, nausea, vomiting but no diarrhea.    GENITOURINARY:  No dysuria, frequency or urgency.    NEUROLOGIC:  No paresthesias, fasciculations, seizures or weakness.    PSYCHIATRIC:  No disorder of thought or mood.    Vital Signs Last 24 Hrs  T(C): 36.7 (13 Oct 2019 11:12), Max: 36.7 (13 Oct 2019 11:12)  T(F): 98 (13 Oct 2019 11:12), Max: 98 (13 Oct 2019 11:12)  HR: 73 (13 Oct 2019 11:12) (73 - 91)  BP: 136/58 (13 Oct 2019 11:12) (136/58 - 156/85)  BP(mean): --  RR: 20 (13 Oct 2019 11:12) (20 - 20)  SpO2: 98% (13 Oct 2019 11:12) (97% - 98%)  I&O's Detail      PHYSICAL EXAMINATION:    GENERAL: The patient is a well-developed, well-nourished _____in no apparent distress.     HEENT: Head is normocephalic and atraumatic. Extraocular muscles are intact. Mucous membranes are moist.     NECK: Supple.     LUNGS: Clear to auscultation without wheezing, rales, or rhonchi. Respirations unlabored    HEART: Regular rate and rhythm without murmur.    ABDOMEN: Soft, nontender, and nondistended.  No hepatosplenomegaly is noted.    EXTREMITIES: Without any cyanosis, clubbing, rash, lesions or edema.    NEUROLOGIC: Grossly intact.      LABS:                        13.9   12.04 )-----------( 400      ( 13 Oct 2019 08:45 )             42.8     1013    135  |  102  |  11  ----------------------------<  135<H>  4.3   |  27  |  0.84    Ca    9.1      13 Oct 2019 08:46    TPro  8.3  /  Alb  3.6  /  TBili  0.5  /  DBili  x   /  AST  23  /  ALT  26  /  AlkPhos  102  10-13      Urinalysis Basic - ( 13 Oct 2019 11:19 )    Color: Yellow / Appearance: Clear / S.005 / pH: x  Gluc: x / Ketone: Negative  / Bili: Negative / Urobili: Negative   Blood: x / Protein: Negative / Nitrite: Negative   Leuk Esterase: Trace / RBC: 5-10 /HPF / WBC 3-5 /HPF   Sq Epi: x / Non Sq Epi: Few /HPF / Bacteria: x                      MICROBIOLOGY:    RADIOLOGY & ADDITIONAL STUDIES:  < from: CT Abdomen and Pelvis w/ Oral Cont and w/ IV Cont (10.13.19 @ 10:59) >  IMPRESSION: Findings compatible with diverticulitis involving the distal   transverse colon/splenic flexure.    < end of copied text >    CXR:    Ct scan chest:    ekg;    echo:
Attending:  Dr Rice     HPI:  65 yo Female with PMHx of HTN, HLD, Hypothyroidism, DM, Diverticulitis admitted to medical services w/ acute onset of diverticulitis; Pt seen and examined at bedside, admits to Q abd pain since Monday, pain associated with nausea and 2 episodes of NBNB vomiting; pain currently improved, nausea and vomiting resolved; pt states she was seen by PCP, was prescribed oral abx, w/o improvement. Pt states it is her 4th episode of diverticular flare up, last episode in 2016 requiring hospitalization. Pt states last colonoscopy done 2 yrs ago and showed diverticular disease. Pt denies changes in bowel habits, no fever, chills, SOB or CP. On clear diet, tolerating well.       PAST MEDICAL & SURGICAL HISTORY:  Diabetes  Hyperlipidemia  Hypothyroid  HTN (hypertension)  S/P rhinoplasty  S/P tubal ligation  S/p abdominoplasty       MEDICATIONS  (STANDING):  atorvastatin 20 milliGRAM(s) Oral at bedtime  ciprofloxacin   IVPB 400 milliGRAM(s) IV Intermittent every 12 hours  ciprofloxacin   IVPB      dextrose 5% + sodium chloride 0.9%. 1000 milliLiter(s) (80 mL/Hr) IV Continuous <Continuous>  dextrose 50% Injectable 25 Gram(s) IV Push once  enoxaparin Injectable 40 milliGRAM(s) SubCutaneous daily  influenza   Vaccine 0.5 milliLiter(s) IntraMuscular once  insulin lispro (HumaLOG) corrective regimen sliding scale   SubCutaneous Before meals and at bedtime  levothyroxine 100 MICROGram(s) Oral daily  losartan 50 milliGRAM(s) Oral daily  metroNIDAZOLE  IVPB      metroNIDAZOLE  IVPB 500 milliGRAM(s) IV Intermittent every 8 hours    MEDICATIONS  (PRN):  acetaminophen   Tablet .. 650 milliGRAM(s) Oral every 6 hours PRN Temp greater or equal to 38C (100.4F), Mild Pain (1 - 3)  ketorolac   Injectable 15 milliGRAM(s) IV Push every 8 hours PRN Moderate Pain (4 - 6)  ondansetron Injectable 4 milliGRAM(s) IV Push every 6 hours PRN Nausea and/or Vomiting      Allergies    No Known Allergies    Intolerances        SOCIAL HISTORY:  Unknown   FAMILY HISTORY:  No pertinent family history in first degree relatives      Vital Signs Last 24 Hrs  T(C): 36.7 (14 Oct 2019 16:39), Max: 37.3 (13 Oct 2019 19:10)  T(F): 98.1 (14 Oct 2019 16:39), Max: 99.1 (13 Oct 2019 19:10)  HR: 62 (14 Oct 2019 16:39) (62 - 77)  BP: 106/52 (14 Oct 2019 16:39) (100/58 - 125/68)  BP(mean): --  RR: 20 (14 Oct 2019 16:39) (16 - 20)  SpO2: 95% (14 Oct 2019 16:39) (95% - 98%)    I&O's Summary      LABS:                        11.9   9.30  )-----------( 334      ( 14 Oct 2019 06:34 )             37.4     10-14    141  |  107  |  7   ----------------------------<  162<H>  4.1   |  26  |  0.81    Ca    8.4      14 Oct 2019 06:34  Phos  3.0     10-14  Mg     2.4     10-14    TPro  6.6  /  Alb  2.9<L>  /  TBili  0.5  /  DBili  x   /  AST  13  /  ALT  17  /  AlkPhos  76  10-14      Urinalysis Basic - ( 13 Oct 2019 11:19 )    Color: Yellow / Appearance: Clear / S.005 / pH: x  Gluc: x / Ketone: Negative  / Bili: Negative / Urobili: Negative   Blood: x / Protein: Negative / Nitrite: Negative   Leuk Esterase: Trace / RBC: 5-10 /HPF / WBC 3-5 /HPF   Sq Epi: x / Non Sq Epi: Few /HPF / Bacteria: x      CAPILLARY BLOOD GLUCOSE      POCT Blood Glucose.: 84 mg/dL (14 Oct 2019 16:57)  POCT Blood Glucose.: 97 mg/dL (14 Oct 2019 12:08)  POCT Blood Glucose.: 134 mg/dL (14 Oct 2019 08:29)  POCT Blood Glucose.: 128 mg/dL (13 Oct 2019 21:59)  POCT Blood Glucose.: 108 mg/dL (13 Oct 2019 18:24)    LIVER FUNCTIONS - ( 14 Oct 2019 06:34 )  Alb: 2.9 g/dL / Pro: 6.6 g/dL / ALK PHOS: 76 U/L / ALT: 17 U/L DA / AST: 13 U/L / GGT: x             RADIOLOGY & ADDITIONAL STUDIES:  < from: CT Abdomen and Pelvis w/ Oral Cont and w/ IV Cont (10.13.19 @ 10:59) >  FINDINGS:    LOWER CHEST: Subsegmental atelectasis at the lung bases.    ABDOMEN:  LIVER: within normal limits.  BILE DUCTS: normal caliber.  GALLBLADDER: No calcified gallstones. Normal caliber wall.  PANCREAS: within normal limits.  SPLEEN: within normal limits.  ADRENALS: within normal limits.  KIDNEYS: within normal limits.    PELVIS:  REPRODUCTIVE ORGANS: no pelvic masses.  URETERS: within normal limits.  BLADDER: within normal limits.      BOWEL: There is colonic diverticulosis. Adjacent to a diverticulum in the   region of the distal transverse colon/splenic flexure there are   inflammatory changes in the adjacent fat and mild colonic wall thickening   in this region. Findings are compatible with diverticulitis. There is no   bowel obstruction. No enlarged mesenteric lymph nodes.  PERITONEUM: no ascites or free air, no fluid collection.  VESSELS: within normal limits.  RETROPERITONEUM: within normal limits.    ABDOMINAL WALL: within normal limits.  BONES: Bilateral pars interarticularis defects at L5 with grade 1   spondylolisthesis of L5 on S1.    Discussed with Dr. Ingram on 10/13/2019 at 11:10 a.m.    IMPRESSION: Findings compatible with diverticulitis involving the distal   transverse colon/splenic flexure.    < end of copied text >

## 2019-10-14 NOTE — PROGRESS NOTE ADULT - SUBJECTIVE AND OBJECTIVE BOX
Patient is a 64y old  Female who presents with a chief complaint of LLQ abdominal pain (14 Oct 2019 09:13)      Awake , alert , lying in bed in NAD. C/o mild abdominal pain. Denies cough or sob at this moment.   INTERVAL HPI/OVERNIGHT EVENTS:      VITAL SIGNS:  T(F): 98.6 (10-14-19 @ 07:41)  HR: 76 (10-14-19 @ 07:41)  BP: 116/64 (10-14-19 @ 07:41)  RR: 17 (10-14-19 @ 07:41)  SpO2: 97% (10-14-19 @ 07:41)  Wt(kg): --  I&O's Detail          REVIEW OF SYSTEMS:    CONSTITUTIONAL:  No fevers, chills, sweats    HEENT:  Eyes:  No diplopia or blurred vision. ENT:  No earache, sore throat or runny nose.    CARDIOVASCULAR:  No pressure, squeezing, tightness, or heaviness about the chest; no palpitations.    RESPIRATORY:  Per HPI    GASTROINTESTINAL:  + abdominal pain, No nausea, vomiting or diarrhea.    GENITOURINARY:  No dysuria, frequency or urgency.    NEUROLOGIC:  No paresthesias, fasciculations, seizures or weakness.    PSYCHIATRIC:  No disorder of thought or mood.      PHYSICAL EXAM:    Constitutional: Well developed and nourished  Eyes:Perrla  ENMT: normal  Neck:supple  Respiratory: good air entry  Cardiovascular: S1 S2 regular  Gastrointestinal: + LLQ tenderness    Extremities: No edema  Vascular:normal  Neurological:Awake, alert,Ox3  Musculoskeletal:Normal      MEDICATIONS  (STANDING):  atorvastatin 20 milliGRAM(s) Oral at bedtime  ciprofloxacin   IVPB 400 milliGRAM(s) IV Intermittent every 12 hours  ciprofloxacin   IVPB      dextrose 5% + sodium chloride 0.9%. 1000 milliLiter(s) (80 mL/Hr) IV Continuous <Continuous>  dextrose 50% Injectable 25 Gram(s) IV Push once  enoxaparin Injectable 40 milliGRAM(s) SubCutaneous daily  insulin lispro (HumaLOG) corrective regimen sliding scale   SubCutaneous Before meals and at bedtime  levothyroxine 100 MICROGram(s) Oral daily  losartan 50 milliGRAM(s) Oral daily  metroNIDAZOLE  IVPB      metroNIDAZOLE  IVPB 500 milliGRAM(s) IV Intermittent every 8 hours    MEDICATIONS  (PRN):  acetaminophen   Tablet .. 650 milliGRAM(s) Oral every 6 hours PRN Temp greater or equal to 38C (100.4F), Mild Pain (1 - 3)  ketorolac   Injectable 15 milliGRAM(s) IV Push every 8 hours PRN Moderate Pain (4 - 6)  ondansetron Injectable 4 milliGRAM(s) IV Push every 6 hours PRN Nausea and/or Vomiting      Allergies    No Known Allergies    Intolerances        LABS:                        11.9   9.30  )-----------( 334      ( 14 Oct 2019 06:34 )             37.4     10-14    141  |  107  |  7   ----------------------------<  162<H>  4.1   |  26  |  0.81    Ca    8.4      14 Oct 2019 06:34  Phos  3.0     10-14  Mg     2.4     10-14    TPro  6.6  /  Alb  2.9<L>  /  TBili  0.5  /  DBili  x   /  AST  13  /  ALT  17  /  AlkPhos  76  10-14      Urinalysis Basic - ( 13 Oct 2019 11:19 )    Color: Yellow / Appearance: Clear / S.005 / pH: x  Gluc: x / Ketone: Negative  / Bili: Negative / Urobili: Negative   Blood: x / Protein: Negative / Nitrite: Negative   Leuk Esterase: Trace / RBC: 5-10 /HPF / WBC 3-5 /HPF   Sq Epi: x / Non Sq Epi: Few /HPF / Bacteria: x            CAPILLARY BLOOD GLUCOSE      POCT Blood Glucose.: 134 mg/dL (14 Oct 2019 08:29)  POCT Blood Glucose.: 128 mg/dL (13 Oct 2019 21:59)  POCT Blood Glucose.: 108 mg/dL (13 Oct 2019 18:24)  POCT Blood Glucose.: 109 mg/dL (13 Oct 2019 15:49)        RADIOLOGY & ADDITIONAL TESTS:  < from: CT Abdomen and Pelvis w/ Oral Cont and w/ IV Cont (10.13.19 @ 10:59) >  IMPRESSION: Findings compatible with diverticulitis involving the distal   transverse colon/splenic flexure.    < end of copied text >      CXR:    Ct scan chest:  < from: CT Abdomen and Pelvis w/ Oral Cont and w/ IV Cont (10.13.19 @ 10:59) >    LOWER CHEST: Subsegmental atelectasis at the lung bases.    < end of copied text >  ekg;    echo: Patient is a 64y old  Female who presents with a chief complaint of LLQ abdominal pain (14 Oct 2019 09:13)      Awake , alert , laying in bed in NAD.  Abdominal pain slightly improved. Denies cough or sob at this moment.   INTERVAL HPI/OVERNIGHT EVENTS:      VITAL SIGNS:  T(F): 98.6 (10-14-19 @ 07:41)  HR: 76 (10-14-19 @ 07:41)  BP: 116/64 (10-14-19 @ 07:41)  RR: 17 (10-14-19 @ 07:41)  SpO2: 97% (10-14-19 @ 07:41)  Wt(kg): --  I&O's Detail          REVIEW OF SYSTEMS:    CONSTITUTIONAL:  No fevers, chills, sweats    HEENT:  Eyes:  No diplopia or blurred vision. ENT:  No earache, sore throat or runny nose.    CARDIOVASCULAR:  No pressure, squeezing, tightness, or heaviness about the chest; no palpitations.    RESPIRATORY:  Per HPI    GASTROINTESTINAL:  + abdominal pain, No nausea, vomiting or diarrhea.    GENITOURINARY:  No dysuria, frequency or urgency.    NEUROLOGIC:  No paresthesias, fasciculations, seizures or weakness.    PSYCHIATRIC:  No disorder of thought or mood.      PHYSICAL EXAM:    Constitutional: Well developed and nourished  Eyes:Perrla  ENMT: normal  Neck:supple  Respiratory: good air entry  Cardiovascular: S1 S2 regular  Gastrointestinal: + LLQ tenderness    Extremities: No edema  Vascular:normal  Neurological:Awake, alert,Ox3  Musculoskeletal:Normal      MEDICATIONS  (STANDING):  atorvastatin 20 milliGRAM(s) Oral at bedtime  ciprofloxacin   IVPB 400 milliGRAM(s) IV Intermittent every 12 hours  ciprofloxacin   IVPB      dextrose 5% + sodium chloride 0.9%. 1000 milliLiter(s) (80 mL/Hr) IV Continuous <Continuous>  dextrose 50% Injectable 25 Gram(s) IV Push once  enoxaparin Injectable 40 milliGRAM(s) SubCutaneous daily  insulin lispro (HumaLOG) corrective regimen sliding scale   SubCutaneous Before meals and at bedtime  levothyroxine 100 MICROGram(s) Oral daily  losartan 50 milliGRAM(s) Oral daily  metroNIDAZOLE  IVPB      metroNIDAZOLE  IVPB 500 milliGRAM(s) IV Intermittent every 8 hours    MEDICATIONS  (PRN):  acetaminophen   Tablet .. 650 milliGRAM(s) Oral every 6 hours PRN Temp greater or equal to 38C (100.4F), Mild Pain (1 - 3)  ketorolac   Injectable 15 milliGRAM(s) IV Push every 8 hours PRN Moderate Pain (4 - 6)  ondansetron Injectable 4 milliGRAM(s) IV Push every 6 hours PRN Nausea and/or Vomiting      Allergies    No Known Allergies    Intolerances        LABS:                        11.9   9.30  )-----------( 334      ( 14 Oct 2019 06:34 )             37.4     10-14    141  |  107  |  7   ----------------------------<  162<H>  4.1   |  26  |  0.81    Ca    8.4      14 Oct 2019 06:34  Phos  3.0     10-  Mg     2.4     10-14    TPro  6.6  /  Alb  2.9<L>  /  TBili  0.5  /  DBili  x   /  AST  13  /  ALT  17  /  AlkPhos  76  10-      Urinalysis Basic - ( 13 Oct 2019 11:19 )    Color: Yellow / Appearance: Clear / S.005 / pH: x  Gluc: x / Ketone: Negative  / Bili: Negative / Urobili: Negative   Blood: x / Protein: Negative / Nitrite: Negative   Leuk Esterase: Trace / RBC: 5-10 /HPF / WBC 3-5 /HPF   Sq Epi: x / Non Sq Epi: Few /HPF / Bacteria: x            CAPILLARY BLOOD GLUCOSE      POCT Blood Glucose.: 134 mg/dL (14 Oct 2019 08:29)  POCT Blood Glucose.: 128 mg/dL (13 Oct 2019 21:59)  POCT Blood Glucose.: 108 mg/dL (13 Oct 2019 18:24)  POCT Blood Glucose.: 109 mg/dL (13 Oct 2019 15:49)        RADIOLOGY & ADDITIONAL TESTS:  < from: CT Abdomen and Pelvis w/ Oral Cont and w/ IV Cont (10.13.19 @ 10:59) >  IMPRESSION: Findings compatible with diverticulitis involving the distal   transverse colon/splenic flexure.    < end of copied text >      CXR:    Ct scan chest:  < from: CT Abdomen and Pelvis w/ Oral Cont and w/ IV Cont (10.13.19 @ 10:59) >    LOWER CHEST: Subsegmental atelectasis at the lung bases.    < end of copied text >  ekg;    echo:

## 2019-10-15 ENCOUNTER — TRANSCRIPTION ENCOUNTER (OUTPATIENT)
Age: 64
End: 2019-10-15

## 2019-10-15 DIAGNOSIS — Z02.9 ENCOUNTER FOR ADMINISTRATIVE EXAMINATIONS, UNSPECIFIED: ICD-10-CM

## 2019-10-15 LAB
ANION GAP SERPL CALC-SCNC: 4 MMOL/L — LOW (ref 5–17)
BUN SERPL-MCNC: 7 MG/DL — SIGNIFICANT CHANGE UP (ref 7–18)
CALCIUM SERPL-MCNC: 9.2 MG/DL — SIGNIFICANT CHANGE UP (ref 8.4–10.5)
CHLORIDE SERPL-SCNC: 107 MMOL/L — SIGNIFICANT CHANGE UP (ref 96–108)
CO2 SERPL-SCNC: 28 MMOL/L — SIGNIFICANT CHANGE UP (ref 22–31)
CREAT SERPL-MCNC: 0.75 MG/DL — SIGNIFICANT CHANGE UP (ref 0.5–1.3)
GLUCOSE BLDC GLUCOMTR-MCNC: 100 MG/DL — HIGH (ref 70–99)
GLUCOSE BLDC GLUCOMTR-MCNC: 114 MG/DL — HIGH (ref 70–99)
GLUCOSE BLDC GLUCOMTR-MCNC: 115 MG/DL — HIGH (ref 70–99)
GLUCOSE BLDC GLUCOMTR-MCNC: 93 MG/DL — SIGNIFICANT CHANGE UP (ref 70–99)
GLUCOSE SERPL-MCNC: 104 MG/DL — HIGH (ref 70–99)
HCT VFR BLD CALC: 39.1 % — SIGNIFICANT CHANGE UP (ref 34.5–45)
HGB BLD-MCNC: 12.3 G/DL — SIGNIFICANT CHANGE UP (ref 11.5–15.5)
MCHC RBC-ENTMCNC: 27.9 PG — SIGNIFICANT CHANGE UP (ref 27–34)
MCHC RBC-ENTMCNC: 31.5 GM/DL — LOW (ref 32–36)
MCV RBC AUTO: 88.7 FL — SIGNIFICANT CHANGE UP (ref 80–100)
NRBC # BLD: 0 /100 WBCS — SIGNIFICANT CHANGE UP (ref 0–0)
PLATELET # BLD AUTO: 356 K/UL — SIGNIFICANT CHANGE UP (ref 150–400)
POTASSIUM SERPL-MCNC: 4.2 MMOL/L — SIGNIFICANT CHANGE UP (ref 3.5–5.3)
POTASSIUM SERPL-SCNC: 4.2 MMOL/L — SIGNIFICANT CHANGE UP (ref 3.5–5.3)
RBC # BLD: 4.41 M/UL — SIGNIFICANT CHANGE UP (ref 3.8–5.2)
RBC # FLD: 12.5 % — SIGNIFICANT CHANGE UP (ref 10.3–14.5)
SODIUM SERPL-SCNC: 139 MMOL/L — SIGNIFICANT CHANGE UP (ref 135–145)
WBC # BLD: 5.53 K/UL — SIGNIFICANT CHANGE UP (ref 3.8–10.5)
WBC # FLD AUTO: 5.53 K/UL — SIGNIFICANT CHANGE UP (ref 3.8–10.5)

## 2019-10-15 RX ADMIN — Medication 100 MILLIGRAM(S): at 21:50

## 2019-10-15 RX ADMIN — Medication 200 MILLIGRAM(S): at 05:55

## 2019-10-15 RX ADMIN — Medication 100 MILLIGRAM(S): at 13:39

## 2019-10-15 RX ADMIN — Medication 100 MILLIGRAM(S): at 05:55

## 2019-10-15 RX ADMIN — Medication 15 MILLIGRAM(S): at 02:04

## 2019-10-15 RX ADMIN — Medication 15 MILLIGRAM(S): at 01:34

## 2019-10-15 RX ADMIN — Medication 100 MICROGRAM(S): at 05:54

## 2019-10-15 RX ADMIN — ATORVASTATIN CALCIUM 20 MILLIGRAM(S): 80 TABLET, FILM COATED ORAL at 21:50

## 2019-10-15 RX ADMIN — LOSARTAN POTASSIUM 50 MILLIGRAM(S): 100 TABLET, FILM COATED ORAL at 05:54

## 2019-10-15 RX ADMIN — ENOXAPARIN SODIUM 40 MILLIGRAM(S): 100 INJECTION SUBCUTANEOUS at 12:22

## 2019-10-15 RX ADMIN — Medication 200 MILLIGRAM(S): at 17:36

## 2019-10-15 NOTE — PROGRESS NOTE ADULT - SUBJECTIVE AND OBJECTIVE BOX
Pt came to ED for c/o  pain and tenderness at LLQ  Diverticulitis was noted to distal and transverse colon on CT         OVERNIGHT EVENTS: no new complaints    MEDICATIONS  (STANDING):  atorvastatin 20 milliGRAM(s) Oral at bedtime  ciprofloxacin   IVPB 400 milliGRAM(s) IV Intermittent every 12 hours  ciprofloxacin   IVPB      dextrose 5% + sodium chloride 0.9%. 1000 milliLiter(s) (80 mL/Hr) IV Continuous <Continuous>  dextrose 50% Injectable 25 Gram(s) IV Push once  enoxaparin Injectable 40 milliGRAM(s) SubCutaneous daily  influenza   Vaccine 0.5 milliLiter(s) IntraMuscular once  insulin lispro (HumaLOG) corrective regimen sliding scale   SubCutaneous Before meals and at bedtime  levothyroxine 100 MICROGram(s) Oral daily  losartan 50 milliGRAM(s) Oral daily  metroNIDAZOLE  IVPB      metroNIDAZOLE  IVPB 500 milliGRAM(s) IV Intermittent every 8 hours    MEDICATIONS  (PRN):  acetaminophen   Tablet .. 650 milliGRAM(s) Oral every 6 hours PRN Temp greater or equal to 38C (100.4F), Mild Pain (1 - 3)  ketorolac   Injectable 15 milliGRAM(s) IV Push every 8 hours PRN Moderate Pain (4 - 6)  ondansetron Injectable 4 milliGRAM(s) IV Push every 6 hours PRN Nausea and/or Vomiting      __________________________________________________  REVIEW OF SYSTEMS:    CONSTITUTIONAL: No fever,   EYES: no acute visual disturbances  NECK: No pain or stiffness  RESPIRATORY: No cough; No shortness of breath  CARDIOVASCULAR: No chest pain, no palpitations  GASTROINTESTINAL: no nausea , vomiting, positive diaarhea  NEUROLOGICAL: No headache or numbness, no tremors  MUSCULOSKELETAL: No joint pain, no muscle pain  GENITOURINARY: no dysuria, no frequency, no hesitancy  PSYCHIATRY: no depression , no anxiety  ALL OTHER  ROS negative        Vital Signs Last 24 Hrs  T(C): 36.2 (15 Oct 2019 05:10), Max: 36.7 (14 Oct 2019 15:38)  T(F): 97.2 (15 Oct 2019 05:10), Max: 98.1 (14 Oct 2019 16:39)  HR: 58 (15 Oct 2019 05:10) (58 - 71)  BP: 123/60 (15 Oct 2019 05:10) (106/52 - 125/68)  BP(mean): --  RR: 14 (15 Oct 2019 05:10) (14 - 20)  SpO2: 96% (15 Oct 2019 05:10) (95% - 97%)    ________________________________________________  PHYSICAL EXAM:  GENERAL: NAD  HEENT: Normocephalic;  conjunctivae and sclerae clear; moist mucous membranes;   NECK : supple  CHEST/LUNG: Clear to auscultation bilaterally with good air entry   HEART: S1 S2  regular; no murmurs, gallops or rubs  ABDOMEN: Soft, Nontender, Nondistended; Bowel sounds present  EXTREMITIES: no cyanosis; no edema; no calf tenderness  SKIN: warm and dry; no rash  NERVOUS SYSTEM:  Awake and alert; Oriented  to place, person and time ; no new deficits    _________________________________________________  LABS:                        12.3   5.53  )-----------( 356      ( 15 Oct 2019 06:27 )             39.1     10-15    139  |  107  |  7   ----------------------------<  104<H>  4.2   |  28  |  0.75    Ca    9.2      15 Oct 2019 06:27  Phos  3.0     10-14  Mg     2.4     10-14    TPro  6.6  /  Alb  2.9<L>  /  TBili  0.5  /  DBili  x   /  AST  13  /  ALT  17  /  AlkPhos  76  10-14        CAPILLARY BLOOD GLUCOSE      POCT Blood Glucose.: 100 mg/dL (15 Oct 2019 08:10)  POCT Blood Glucose.: 101 mg/dL (14 Oct 2019 21:34)  POCT Blood Glucose.: 84 mg/dL (14 Oct 2019 16:57)  POCT Blood Glucose.: 97 mg/dL (14 Oct 2019 12:08)        RADIOLOGY & ADDITIONAL TESTS:    Imaging Personally Reviewed:  YES/NO    Consultant(s) Notes Reviewed:   YES/ No    Care Discussed with Consultants :     Plan of care was discussed with patient and /or primary care giver; all questions and concerns were addressed and care was aligned with patient's wishes.

## 2019-10-15 NOTE — PROGRESS NOTE ADULT - SUBJECTIVE AND OBJECTIVE BOX
Pt is awake, alert, lying in bed in NAD. C/o persistent LLQ pain. Denies nausea/vomiting and diarrhea.     INTERVAL HPI/OVERNIGHT EVENTS:      VITAL SIGNS:  T(F): 97.2 (10-15-19 @ 05:10)  HR: 58 (10-15-19 @ 05:10)  BP: 123/60 (10-15-19 @ 05:10)  RR: 14 (10-15-19 @ 05:10)  SpO2: 96% (10-15-19 @ 05:10)  Wt(kg): --  I&O's Detail    REVIEW OF SYSTEMS:    CONSTITUTIONAL:  No fevers, chills, sweats    HEENT:  Eyes:  No diplopia or blurred vision. ENT:  No earache, sore throat or runny nose.    CARDIOVASCULAR:  No pressure, squeezing, tightness, or heaviness about the chest; no palpitations.    RESPIRATORY:  Per HPI    GASTROINTESTINAL:  No abdominal pain, nausea, vomiting or diarrhea.    GENITOURINARY:  No dysuria, frequency or urgency.    NEUROLOGIC:  No paresthesias, fasciculations, seizures or weakness.    PSYCHIATRIC:  No disorder of thought or mood.      PHYSICAL EXAM:    Constitutional: Well developed and nourished  Eyes:Perrla  ENMT: normal  Neck:supple  Respiratory: good air entry  Cardiovascular: S1 S2 regular  Gastrointestinal: Soft, Non tender  Extremities: No edema  Vascular:normal  Neurological:Awake, alert,Ox3  Musculoskeletal:Normal      MEDICATIONS  (STANDING):  atorvastatin 20 milliGRAM(s) Oral at bedtime  ciprofloxacin   IVPB 400 milliGRAM(s) IV Intermittent every 12 hours  ciprofloxacin   IVPB      dextrose 5% + sodium chloride 0.9%. 1000 milliLiter(s) (80 mL/Hr) IV Continuous <Continuous>  dextrose 50% Injectable 25 Gram(s) IV Push once  enoxaparin Injectable 40 milliGRAM(s) SubCutaneous daily  influenza   Vaccine 0.5 milliLiter(s) IntraMuscular once  insulin lispro (HumaLOG) corrective regimen sliding scale   SubCutaneous Before meals and at bedtime  levothyroxine 100 MICROGram(s) Oral daily  losartan 50 milliGRAM(s) Oral daily  metroNIDAZOLE  IVPB      metroNIDAZOLE  IVPB 500 milliGRAM(s) IV Intermittent every 8 hours    MEDICATIONS  (PRN):  acetaminophen   Tablet .. 650 milliGRAM(s) Oral every 6 hours PRN Temp greater or equal to 38C (100.4F), Mild Pain (1 - 3)  ketorolac   Injectable 15 milliGRAM(s) IV Push every 8 hours PRN Moderate Pain (4 - 6)  ondansetron Injectable 4 milliGRAM(s) IV Push every 6 hours PRN Nausea and/or Vomiting      Allergies    No Known Allergies    Intolerances        LABS:                        12.3   5.53  )-----------( 356      ( 15 Oct 2019 06:27 )             39.1     10-15    139  |  107  |  7   ----------------------------<  104<H>  4.2   |  28  |  0.75    Ca    9.2      15 Oct 2019 06:27  Phos  3.0     10-14  Mg     2.4     10-14    TPro  6.6  /  Alb  2.9<L>  /  TBili  0.5  /  DBili  x   /  AST  13  /  ALT  17  /  AlkPhos  76  10-14    CAPILLARY BLOOD GLUCOSE      POCT Blood Glucose.: 114 mg/dL (15 Oct 2019 12:03)  POCT Blood Glucose.: 100 mg/dL (15 Oct 2019 08:10)  POCT Blood Glucose.: 101 mg/dL (14 Oct 2019 21:34)  POCT Blood Glucose.: 84 mg/dL (14 Oct 2019 16:57)        RADIOLOGY & ADDITIONAL TESTS:    CXR:    Ct scan chest:  < from: CT Abdomen and Pelvis w/ Oral Cont and w/ IV Cont (10.13.19 @ 10:59) >  IMPRESSION: Findings compatible with diverticulitis involving the distal   transverse colon/splenic flexure.    < end of copied text >    ekg;    echo:

## 2019-10-15 NOTE — PROGRESS NOTE ADULT - SUBJECTIVE AND OBJECTIVE BOX
Patient is a 64y old  Female who presents with a chief complaint of LLQ abdominal pain (15 Oct 2019 21:37)    pt seen in ed [ x ], reg med floor [   ], bed [ x ], chair at bedside [   ], a+o x3 [ x ], lethargic [  ],  nad [ x ]          Allergies    No Known Allergies        Vitals    T(F): 97.9 (10-15-19 @ 20:36), Max: 98.1 (10-15-19 @ 14:56)  HR: 57 (10-15-19 @ 20:36) (57 - 78)  BP: 106/61 (10-15-19 @ 20:36) (106/61 - 123/60)  RR: 16 (10-15-19 @ 20:36) (14 - 16)  SpO2: 99% (10-15-19 @ 20:36) (96% - 99%)  Wt(kg): --  CAPILLARY BLOOD GLUCOSE      POCT Blood Glucose.: 93 mg/dL (15 Oct 2019 21:09)      Labs                          12.3   5.53  )-----------( 356      ( 15 Oct 2019 06:27 )             39.1       10-15    139  |  107  |  7   ----------------------------<  104<H>  4.2   |  28  |  0.75    Ca    9.2      15 Oct 2019 06:27  Phos  3.0     10-14  Mg     2.4     10-14    TPro  6.6  /  Alb  2.9<L>  /  TBili  0.5  /  DBili  x   /  AST  13  /  ALT  17  /  AlkPhos  76  10-14            .Blood  10-13 @ 22:06   No growth to date.  --  --          Radiology Results      Meds    MEDICATIONS  (STANDING):  atorvastatin 20 milliGRAM(s) Oral at bedtime  ciprofloxacin   IVPB 400 milliGRAM(s) IV Intermittent every 12 hours  ciprofloxacin   IVPB      dextrose 5% + sodium chloride 0.9%. 1000 milliLiter(s) (80 mL/Hr) IV Continuous <Continuous>  dextrose 50% Injectable 25 Gram(s) IV Push once  enoxaparin Injectable 40 milliGRAM(s) SubCutaneous daily  influenza   Vaccine 0.5 milliLiter(s) IntraMuscular once  insulin lispro (HumaLOG) corrective regimen sliding scale   SubCutaneous Before meals and at bedtime  levothyroxine 100 MICROGram(s) Oral daily  losartan 50 milliGRAM(s) Oral daily  metroNIDAZOLE  IVPB      metroNIDAZOLE  IVPB 500 milliGRAM(s) IV Intermittent every 8 hours      MEDICATIONS  (PRN):  acetaminophen   Tablet .. 650 milliGRAM(s) Oral every 6 hours PRN Temp greater or equal to 38C (100.4F), Mild Pain (1 - 3)  ketorolac   Injectable 15 milliGRAM(s) IV Push every 8 hours PRN Moderate Pain (4 - 6)  ondansetron Injectable 4 milliGRAM(s) IV Push every 6 hours PRN Nausea and/or Vomiting      Physical Exam    Neuro :  no focal deficits  Respiratory: CTA B/L  CV: RRR, S1S2, no murmurs,   Abdominal: Soft, non tender, ND +BS,  Extremities: No edema, + peripheral pulses      ASSESSMENT    Diverticulitis of intestine without perforation or abscess without bleeding  h/o Diabetes  Hyperlipidemia  Hypothyroid  HTN (hypertension)  S/P rhinoplasty  S/P tubal ligation      PLAN      ct abd pelv with Findings compatible with diverticulitis involving the distal transverse colon/splenic flexure noted above   cont cipro and flagyl  gi f/u  pt to have Colonoscopy in 6-8 weeks out patient   Surgical cons  advance to soft diet   pulm f/u  incentive spirometry  Bronchodilators  cont current meds

## 2019-10-15 NOTE — DISCHARGE NOTE PROVIDER - HOSPITAL COURSE
61 F from home lives with  , PMH of HTN, hypothyroidism, DM presented with left lower abdominal pain. As per patient she is having intermittent , sharp lower left abdominal pain, associated with decreased appetite. Denied any diarrhea or constipation , last BM yesterday morning, normal in consistency , no blood. Denied any nausea or vomiting. She had one episode of colitis 7 years ago and had colonoscopy after that. ROS negative for fever, chills, headache, chest pain, SOB, urinary burning or pain.      CT abdomen done which findings are compatible with diverticulitis.         In ED, pt was given Zosyn x 1 dose and 1L NS. Pt was kept NPO. Was then started on IV Flagyl & IV Cipro and continued on gentle hydration. Dr. Webster from GI was consulted. Diet was advanced to clear liquid diet and then advanced as tolerated.                ............................................INCOMPLETE ......................................... 61 F from home lives with , PMH of HTN, hypothyroidism, DM presented with left lower abdominal pain. CT abdomen done which findings are compatible with diverticulitis. Pt admitted for diverticulitis. Treated with IV antibiotics. Pt to complete course with oral antibiotics. Leukocytosis resolved. Seen by GI. Colonoscopy recommended as an outpatient in 6-8 weeks. Seen by surgery, no interventions, pt to follow up as outpatient. Diet advanced, pt tolerating soft diet.          Pt is stable for discharge home.

## 2019-10-15 NOTE — DISCHARGE NOTE PROVIDER - CARE PROVIDERS DIRECT ADDRESSES
,DirectAddress_Unknown,DirectAddress_Unknown,[email or web address]

## 2019-10-15 NOTE — DISCHARGE NOTE PROVIDER - PROVIDER TOKENS
PROVIDER:[TOKEN:[[id number]]],PROVIDER:[TOKEN:[[id number]]],PROVIDER:[TOKEN:[[id number]]]

## 2019-10-15 NOTE — DISCHARGE NOTE PROVIDER - CARE PROVIDER_API CALL
Ally Culver)  Internal Medicine  [address] [place] [address]  Phone: [phone]  Fax: [phone]  Follow Up Time:     Manish Lin)  Medicine  [address] [place] [address]  Phone: [phone]  Fax: [phone]  Follow Up Time:     Solomon Rice)  Surgery  [address] [place] [address]  Phone: [phone]  Fax: [phone]  Follow Up Time:

## 2019-10-15 NOTE — PROGRESS NOTE ADULT - SUBJECTIVE AND OBJECTIVE BOX
INTERVAL HPI/OVERNIGHT EVENTS:    Pt seen and examined at bedside. No acute complaints at this time, pain improved, no nausea or vomiting; no fever, chills, SOB or CP.   On clears, tolerating well.      Vital Signs Last 24 Hrs  T(C): 36.2 (15 Oct 2019 05:10), Max: 36.8 (14 Oct 2019 10:52)  T(F): 97.2 (15 Oct 2019 05:10), Max: 98.3 (14 Oct 2019 10:52)  HR: 58 (15 Oct 2019 05:10) (58 - 71)  BP: 123/60 (15 Oct 2019 05:10) (100/58 - 125/68)  BP(mean): --  RR: 14 (15 Oct 2019 05:10) (14 - 20)  SpO2: 96% (15 Oct 2019 05:10) (95% - 98%)  I&O's Detail    ciprofloxacin   IVPB 400 milliGRAM(s) IV Intermittent every 12 hours  ciprofloxacin   IVPB      metroNIDAZOLE  IVPB      metroNIDAZOLE  IVPB 500 milliGRAM(s) IV Intermittent every 8 hours      Physical Exam  General: AAOx3, No acute distress  Skin: No jaundice, no icterus  Abdomen: soft,  nondistended, min LLQ TTP, no rebound tenderness, no guarding, no palpable masses  Extremities: non edematous, no calf pain bilaterally        Labs:                        12.3   5.53  )-----------( 356      ( 15 Oct 2019 06:27 )             39.1     10-15    139  |  107  |  7   ----------------------------<  104<H>  4.2   |  28  |  0.75    Ca    9.2      15 Oct 2019 06:27  Phos  3.0     10-14  Mg     2.4     10-14    TPro  6.6  /  Alb  2.9<L>  /  TBili  0.5  /  DBili  x   /  AST  13  /  ALT  17  /  AlkPhos  76  10-14

## 2019-10-15 NOTE — PROGRESS NOTE ADULT - SUBJECTIVE AND OBJECTIVE BOX
[   ] ICU                                          [   ] CCU                                      [ X  ] Medical Floor      Patient is comfortable. No new complaints reported, No abdominal pain, N/V, hematemesis, hematochezia, melena, fever, chills, chest pain, SOB, cough or diarrhea reported.    VITALS  Vital Signs Last 24 Hrs  T(C): 36.6 (15 Oct 2019 20:36), Max: 36.7 (14 Oct 2019 21:59)  T(F): 97.9 (15 Oct 2019 20:36), Max: 98.1 (15 Oct 2019 14:56)  HR: 57 (15 Oct 2019 20:36) (57 - 78)  BP: 106/61 (15 Oct 2019 20:36) (106/61 - 123/60)   RR: 16 (15 Oct 2019 20:36) (14 - 16)  SpO2: 99% (15 Oct 2019 20:36) (96% - 99%)       MEDICATIONS  (STANDING):  atorvastatin 20 milliGRAM(s) Oral at bedtime  ciprofloxacin   IVPB 400 milliGRAM(s) IV Intermittent every 12 hours  ciprofloxacin   IVPB      dextrose 5% + sodium chloride 0.9%. 1000 milliLiter(s) (80 mL/Hr) IV Continuous <Continuous>  dextrose 50% Injectable 25 Gram(s) IV Push once  enoxaparin Injectable 40 milliGRAM(s) SubCutaneous daily  influenza   Vaccine 0.5 milliLiter(s) IntraMuscular once  insulin lispro (HumaLOG) corrective regimen sliding scale   SubCutaneous Before meals and at bedtime  levothyroxine 100 MICROGram(s) Oral daily  losartan 50 milliGRAM(s) Oral daily  metroNIDAZOLE  IVPB      metroNIDAZOLE  IVPB 500 milliGRAM(s) IV Intermittent every 8 hours    MEDICATIONS  (PRN):  acetaminophen   Tablet .. 650 milliGRAM(s) Oral every 6 hours PRN Temp greater or equal to 38C (100.4F), Mild Pain (1 - 3)  ketorolac   Injectable 15 milliGRAM(s) IV Push every 8 hours PRN Moderate Pain (4 - 6)  ondansetron Injectable 4 milliGRAM(s) IV Push every 6 hours PRN Nausea and/or Vomiting                            12.3   5.53  )-----------( 356      ( 15 Oct 2019 06:27 )             39.1       10-15    139  |  107  |  7   ----------------------------<  104<H>  4.2   |  28  |  0.75    Ca    9.2      15 Oct 2019 06:27  Phos  3.0     10-14  Mg     2.4     10-14    TPro  6.6  /  Alb  2.9<L>  /  TBili  0.5  /  DBili  x   /  AST  13  /  ALT  17  /  AlkPhos  76  10-14

## 2019-10-15 NOTE — DISCHARGE NOTE PROVIDER - NSDCCPCAREPLAN_GEN_ALL_CORE_FT
PRINCIPAL DISCHARGE DIAGNOSIS  Diagnosis: Acute diverticulitis  Assessment and Plan of Treatment: You were admitted with diverticulitis, a condition that causes small pockets along your intestine called diverticula to become inflamed or infected. This is caused by hard bowel movement, food, or bacteria that get stuck in the pockets. You were treated with IV antibiotics. Please complete 6 more days of oral antibiotics to complete your course. Follow up with the GI doctor within one week of discharge to schedule a colonoscopy in 6-8 weeks. Follow up with surgery within one week of discharge. Follow up with your primary care doctor within one week.   Please seek medical attention if your symptoms return or you develop bloody bowel movements, severe abdominal pain, diarrhea, nausea, or inability to have a bowel movement.         SECONDARY DISCHARGE DIAGNOSES  Diagnosis: HTN (hypertension)  Assessment and Plan of Treatment: Continue your medications and follow up with your primary care doctor.    Diagnosis: Hypothyroid  Assessment and Plan of Treatment: Continue your medications and follow up with your primary care doctor.

## 2019-10-16 ENCOUNTER — INBOUND DOCUMENT (OUTPATIENT)
Age: 64
End: 2019-10-16

## 2019-10-16 ENCOUNTER — TRANSCRIPTION ENCOUNTER (OUTPATIENT)
Age: 64
End: 2019-10-16

## 2019-10-16 VITALS
SYSTOLIC BLOOD PRESSURE: 121 MMHG | HEART RATE: 66 BPM | DIASTOLIC BLOOD PRESSURE: 58 MMHG | TEMPERATURE: 98 F | RESPIRATION RATE: 16 BRPM | OXYGEN SATURATION: 98 %

## 2019-10-16 LAB
GLUCOSE BLDC GLUCOMTR-MCNC: 104 MG/DL — HIGH (ref 70–99)
GLUCOSE BLDC GLUCOMTR-MCNC: 112 MG/DL — HIGH (ref 70–99)
GLUCOSE BLDC GLUCOMTR-MCNC: 96 MG/DL — SIGNIFICANT CHANGE UP (ref 70–99)

## 2019-10-16 PROCEDURE — 85027 COMPLETE CBC AUTOMATED: CPT

## 2019-10-16 PROCEDURE — 83690 ASSAY OF LIPASE: CPT

## 2019-10-16 PROCEDURE — 84443 ASSAY THYROID STIM HORMONE: CPT

## 2019-10-16 PROCEDURE — 81001 URINALYSIS AUTO W/SCOPE: CPT

## 2019-10-16 PROCEDURE — 90686 IIV4 VACC NO PRSV 0.5 ML IM: CPT

## 2019-10-16 PROCEDURE — 82306 VITAMIN D 25 HYDROXY: CPT

## 2019-10-16 PROCEDURE — 99285 EMERGENCY DEPT VISIT HI MDM: CPT | Mod: 25

## 2019-10-16 PROCEDURE — 80061 LIPID PANEL: CPT

## 2019-10-16 PROCEDURE — 80053 COMPREHEN METABOLIC PANEL: CPT

## 2019-10-16 PROCEDURE — 36415 COLL VENOUS BLD VENIPUNCTURE: CPT

## 2019-10-16 PROCEDURE — 84100 ASSAY OF PHOSPHORUS: CPT

## 2019-10-16 PROCEDURE — 82962 GLUCOSE BLOOD TEST: CPT

## 2019-10-16 PROCEDURE — 83735 ASSAY OF MAGNESIUM: CPT

## 2019-10-16 PROCEDURE — 87040 BLOOD CULTURE FOR BACTERIA: CPT

## 2019-10-16 PROCEDURE — 96375 TX/PRO/DX INJ NEW DRUG ADDON: CPT

## 2019-10-16 PROCEDURE — 96374 THER/PROPH/DIAG INJ IV PUSH: CPT | Mod: XU

## 2019-10-16 PROCEDURE — 86803 HEPATITIS C AB TEST: CPT

## 2019-10-16 PROCEDURE — 82746 ASSAY OF FOLIC ACID SERUM: CPT

## 2019-10-16 PROCEDURE — 80048 BASIC METABOLIC PNL TOTAL CA: CPT

## 2019-10-16 PROCEDURE — 83036 HEMOGLOBIN GLYCOSYLATED A1C: CPT

## 2019-10-16 PROCEDURE — 74177 CT ABD & PELVIS W/CONTRAST: CPT

## 2019-10-16 PROCEDURE — 82607 VITAMIN B-12: CPT

## 2019-10-16 RX ORDER — ACETAMINOPHEN 500 MG
2 TABLET ORAL
Qty: 0 | Refills: 0 | DISCHARGE
Start: 2019-10-16

## 2019-10-16 RX ORDER — METRONIDAZOLE 500 MG
1 TABLET ORAL
Qty: 18 | Refills: 0
Start: 2019-10-16 | End: 2019-10-21

## 2019-10-16 RX ORDER — MOXIFLOXACIN HYDROCHLORIDE TABLETS, 400 MG 400 MG/1
1 TABLET, FILM COATED ORAL
Qty: 12 | Refills: 0
Start: 2019-10-16 | End: 2019-10-21

## 2019-10-16 RX ADMIN — Medication 100 MILLIGRAM(S): at 05:16

## 2019-10-16 RX ADMIN — Medication 100 MILLIGRAM(S): at 14:15

## 2019-10-16 RX ADMIN — ENOXAPARIN SODIUM 40 MILLIGRAM(S): 100 INJECTION SUBCUTANEOUS at 14:16

## 2019-10-16 RX ADMIN — Medication 200 MILLIGRAM(S): at 17:33

## 2019-10-16 RX ADMIN — Medication 100 MICROGRAM(S): at 05:17

## 2019-10-16 RX ADMIN — LOSARTAN POTASSIUM 50 MILLIGRAM(S): 100 TABLET, FILM COATED ORAL at 05:19

## 2019-10-16 RX ADMIN — INFLUENZA VIRUS VACCINE 0.5 MILLILITER(S): 15; 15; 15; 15 SUSPENSION INTRAMUSCULAR at 17:36

## 2019-10-16 RX ADMIN — Medication 200 MILLIGRAM(S): at 05:17

## 2019-10-16 NOTE — PROGRESS NOTE ADULT - SUBJECTIVE AND OBJECTIVE BOX
Pt is awake, alert, lying in bed in NAD. Some LLQ discomfort.     INTERVAL HPI/OVERNIGHT EVENTS:      VITAL SIGNS:  T(F): 98.1 (10-16-19 @ 05:18)  HR: 59 (10-16-19 @ 05:18)  BP: 119/67 (10-16-19 @ 05:18)  RR: 16 (10-16-19 @ 05:18)  SpO2: 98% (10-16-19 @ 05:18)  Wt(kg): --  I&O's Detail          REVIEW OF SYSTEMS:    CONSTITUTIONAL:  No fevers, chills, sweats    HEENT:  Eyes:  No diplopia or blurred vision. ENT:  No earache, sore throat or runny nose.    CARDIOVASCULAR:  No pressure, squeezing, tightness, or heaviness about the chest; no palpitations.    RESPIRATORY:  Per HPI    GASTROINTESTINAL:  No abdominal pain, nausea, vomiting or diarrhea.    GENITOURINARY:  No dysuria, frequency or urgency.    NEUROLOGIC:  No paresthesias, fasciculations, seizures or weakness.    PSYCHIATRIC:  No disorder of thought or mood.      PHYSICAL EXAM:    Constitutional: Well developed and nourished  Eyes:Perrla  ENMT: normal  Neck: supple  Respiratory: good air entry  Cardiovascular: S1 S2 regular  Gastrointestinal: Soft, LLQ discomfort.   Extremities: No edema  Vascular: normal  Neurological: Awake, alert,Ox3  Musculoskeletal: Normal      MEDICATIONS  (STANDING):  atorvastatin 20 milliGRAM(s) Oral at bedtime  ciprofloxacin   IVPB 400 milliGRAM(s) IV Intermittent every 12 hours  ciprofloxacin   IVPB      dextrose 5% + sodium chloride 0.9%. 1000 milliLiter(s) (80 mL/Hr) IV Continuous <Continuous>  dextrose 50% Injectable 25 Gram(s) IV Push once  enoxaparin Injectable 40 milliGRAM(s) SubCutaneous daily  influenza   Vaccine 0.5 milliLiter(s) IntraMuscular once  insulin lispro (HumaLOG) corrective regimen sliding scale   SubCutaneous Before meals and at bedtime  levothyroxine 100 MICROGram(s) Oral daily  losartan 50 milliGRAM(s) Oral daily  metroNIDAZOLE  IVPB 500 milliGRAM(s) IV Intermittent every 8 hours  metroNIDAZOLE  IVPB        MEDICATIONS  (PRN):  acetaminophen   Tablet .. 650 milliGRAM(s) Oral every 6 hours PRN Temp greater or equal to 38C (100.4F), Mild Pain (1 - 3)  ketorolac   Injectable 15 milliGRAM(s) IV Push every 8 hours PRN Moderate Pain (4 - 6)  ondansetron Injectable 4 milliGRAM(s) IV Push every 6 hours PRN Nausea and/or Vomiting      Allergies    No Known Allergies    Intolerances        LABS:                        12.3   5.53  )-----------( 356      ( 15 Oct 2019 06:27 )             39.1     10-15    139  |  107  |  7   ----------------------------<  104<H>  4.2   |  28  |  0.75    Ca    9.2      15 Oct 2019 06:27                CAPILLARY BLOOD GLUCOSE      POCT Blood Glucose.: 112 mg/dL (16 Oct 2019 07:51)  POCT Blood Glucose.: 93 mg/dL (15 Oct 2019 21:09)  POCT Blood Glucose.: 115 mg/dL (15 Oct 2019 16:57)  POCT Blood Glucose.: 114 mg/dL (15 Oct 2019 12:03)        RADIOLOGY & ADDITIONAL TESTS:    CXR:    Ct scan chest:  < from: CT Abdomen and Pelvis w/ Oral Cont and w/ IV Cont (10.13.19 @ 10:59) >  IMPRESSION: Findings compatible with diverticulitis involving the distal   transverse colon/splenic flexure.    < end of copied text >    ekg;    echo:

## 2019-10-16 NOTE — PROGRESS NOTE ADULT - NEGATIVE PSYCHIATRIC SYMPTOMS
no suicidal ideation/no memory loss/no depression/no anxiety/no agitation
no anxiety/no memory loss/no agitation/no depression/no suicidal ideation
no suicidal ideation/no depression/no anxiety/no memory loss/no agitation

## 2019-10-16 NOTE — PROGRESS NOTE ADULT - RS GEN PE MLT RESP DETAILS PC
good air movement/no wheezes/breath sounds equal/airway patent
breath sounds equal/no wheezes/airway patent/good air movement/airway obstructed
airway patent/no rhonchi/breath sounds equal/good air movement/no rales

## 2019-10-16 NOTE — PROGRESS NOTE ADULT - NEGATIVE GENERAL SYMPTOMS
no chills/no fever/no anorexia/no sweating
no chills/no sweating/no anorexia/no fever
no fever/no chills/no sweating/no anorexia

## 2019-10-16 NOTE — PROGRESS NOTE ADULT - PROBLEM SELECTOR PLAN 5
Statins   F/u lipid profile
Plan is for pt to return home
Statins   F/u lipid profile
Statins   F/u lipid profile

## 2019-10-16 NOTE — DISCHARGE NOTE NURSING/CASE MANAGEMENT/SOCIAL WORK - PATIENT PORTAL LINK FT
You can access the FollowMyHealth Patient Portal offered by [facility] by registering at the following website: [email or web address]. By joining [facility]’s FollowMyHealth portal, you will also be able to view your health information using other applications (apps) compatible with our system.

## 2019-10-16 NOTE — PROGRESS NOTE ADULT - SUBJECTIVE AND OBJECTIVE BOX
Patient is a 64y old  Female who presents with a chief complaint of LLQ abdominal pain (16 Oct 2019 11:35)    pt seen in icu [  ], reg med floor [   ], bed [  ], chair at bedside [   ], a+o x3 [  ], lethargic [  ],  nad [  ]    oswald [  ], ngt [  ], peg [  ], et tube [  ], cent line [  ], picc line [  ]        Allergies    No Known Allergies        Vitals    T(F): 98.1 (10-16-19 @ 05:18), Max: 98.1 (10-15-19 @ 14:56)  HR: 59 (10-16-19 @ 05:18) (57 - 78)  BP: 119/67 (10-16-19 @ 05:18) (106/61 - 119/67)  RR: 16 (10-16-19 @ 05:18) (16 - 16)  SpO2: 98% (10-16-19 @ 05:18) (96% - 99%)  Wt(kg): --  CAPILLARY BLOOD GLUCOSE      POCT Blood Glucose.: 104 mg/dL (16 Oct 2019 12:04)      Labs                          12.3   5.53  )-----------( 356      ( 15 Oct 2019 06:27 )             39.1       10-15    139  |  107  |  7   ----------------------------<  104<H>  4.2   |  28  |  0.75    Ca    9.2      15 Oct 2019 06:27              .Blood  10-13 @ 22:06   No growth to date.  --  --          Radiology Results      Meds    MEDICATIONS  (STANDING):  atorvastatin 20 milliGRAM(s) Oral at bedtime  ciprofloxacin   IVPB 400 milliGRAM(s) IV Intermittent every 12 hours  ciprofloxacin   IVPB      dextrose 5% + sodium chloride 0.9%. 1000 milliLiter(s) (80 mL/Hr) IV Continuous <Continuous>  dextrose 50% Injectable 25 Gram(s) IV Push once  enoxaparin Injectable 40 milliGRAM(s) SubCutaneous daily  influenza   Vaccine 0.5 milliLiter(s) IntraMuscular once  insulin lispro (HumaLOG) corrective regimen sliding scale   SubCutaneous Before meals and at bedtime  levothyroxine 100 MICROGram(s) Oral daily  losartan 50 milliGRAM(s) Oral daily  metroNIDAZOLE  IVPB 500 milliGRAM(s) IV Intermittent every 8 hours  metroNIDAZOLE  IVPB          MEDICATIONS  (PRN):  acetaminophen   Tablet .. 650 milliGRAM(s) Oral every 6 hours PRN Temp greater or equal to 38C (100.4F), Mild Pain (1 - 3)  ketorolac   Injectable 15 milliGRAM(s) IV Push every 8 hours PRN Moderate Pain (4 - 6)  ondansetron Injectable 4 milliGRAM(s) IV Push every 6 hours PRN Nausea and/or Vomiting      Physical Exam    Neuro :  no focal deficits  Respiratory: CTA B/L  CV: RRR, S1S2, no murmurs,   Abdominal: Soft, NT, ND +BS,  Extremities: No edema, + peripheral pulses    ASSESSMENT    Diverticulitis of intestine without perforation or abscess without bleeding  Diabetes  Hyperlipidemia  Hypothyroid  HTN (hypertension)  S/P rhinoplasty  S/P tubal ligation      PLAN Patient is a 64y old  Female who presents with a chief complaint of LLQ abdominal pain (16 Oct 2019 11:35)    pt seen in icu [  ], reg med floor [ x  ], bed [ x ], chair at bedside [   ], a+o x3 [ x ], lethargic [  ],  nad [ x ]      Allergies    No Known Allergies        Vitals    T(F): 98.1 (10-16-19 @ 05:18), Max: 98.1 (10-15-19 @ 14:56)  HR: 59 (10-16-19 @ 05:18) (57 - 78)  BP: 119/67 (10-16-19 @ 05:18) (106/61 - 119/67)  RR: 16 (10-16-19 @ 05:18) (16 - 16)  SpO2: 98% (10-16-19 @ 05:18) (96% - 99%)  Wt(kg): --  CAPILLARY BLOOD GLUCOSE      POCT Blood Glucose.: 104 mg/dL (16 Oct 2019 12:04)      Labs                          12.3   5.53  )-----------( 356      ( 15 Oct 2019 06:27 )             39.1       10-15    139  |  107  |  7   ----------------------------<  104<H>  4.2   |  28  |  0.75    Ca    9.2      15 Oct 2019 06:27              .Blood  10-13 @ 22:06   No growth to date.  --  --          Radiology Results      Meds    MEDICATIONS  (STANDING):  atorvastatin 20 milliGRAM(s) Oral at bedtime  ciprofloxacin   IVPB 400 milliGRAM(s) IV Intermittent every 12 hours  ciprofloxacin   IVPB      dextrose 5% + sodium chloride 0.9%. 1000 milliLiter(s) (80 mL/Hr) IV Continuous <Continuous>  dextrose 50% Injectable 25 Gram(s) IV Push once  enoxaparin Injectable 40 milliGRAM(s) SubCutaneous daily  influenza   Vaccine 0.5 milliLiter(s) IntraMuscular once  insulin lispro (HumaLOG) corrective regimen sliding scale   SubCutaneous Before meals and at bedtime  levothyroxine 100 MICROGram(s) Oral daily  losartan 50 milliGRAM(s) Oral daily  metroNIDAZOLE  IVPB 500 milliGRAM(s) IV Intermittent every 8 hours  metroNIDAZOLE  IVPB          MEDICATIONS  (PRN):  acetaminophen   Tablet .. 650 milliGRAM(s) Oral every 6 hours PRN Temp greater or equal to 38C (100.4F), Mild Pain (1 - 3)  ketorolac   Injectable 15 milliGRAM(s) IV Push every 8 hours PRN Moderate Pain (4 - 6)  ondansetron Injectable 4 milliGRAM(s) IV Push every 6 hours PRN Nausea and/or Vomiting      Physical Exam      Neuro :  no focal deficits  Respiratory: CTA B/L  CV: RRR, S1S2, no murmurs,   Abdominal: Soft, non tender, ND +BS,  Extremities: No edema, + peripheral pulses      ASSESSMENT    Diverticulitis of intestine without perforation or abscess without bleeding  h/o Diabetes  Hyperlipidemia  Hypothyroid  HTN (hypertension)  S/P rhinoplasty  S/P tubal ligation      PLAN      ct abd pelv with Findings compatible with diverticulitis involving the distal transverse colon/splenic flexure noted above   cont cipro and flagyl to complete 10 days  gi f/u  pt to have Colonoscopy in 6-8 weeks out patient   Surgical cons  advance to soft diet   pulm f/u  incentive spirometry  Bronchodilators  cont current meds  d/c plan after dinner if diet is tolerated

## 2019-10-16 NOTE — PROGRESS NOTE ADULT - REASON FOR ADMISSION
LLQ abdominal pain

## 2019-10-16 NOTE — PROGRESS NOTE ADULT - GASTROINTESTINAL DETAILS
no guarding/nontender/bowel sounds normal/soft/no rebound tenderness/no distention/no rigidity
soft/no distention/nontender
no distention/no rebound tenderness/no rigidity/soft/no guarding/bowel sounds normal

## 2019-10-16 NOTE — PROGRESS NOTE ADULT - PROBLEM SELECTOR PLAN 6
F/u TFTs  Cont meds
Continue Lovenox for DVT ppx  Cont Pantoprazole for GI ppx
F/u TFTs  Cont meds
F/u TFTs  Cont meds

## 2019-10-16 NOTE — DISCHARGE NOTE NURSING/CASE MANAGEMENT/SOCIAL WORK - NSDCVIVACCINE_GEN_ALL_CORE_FT
Influenza , 2016/12/1 17:12 , Criss Cortes (RN)  Influenza , 2019/10/16 17:36 , Cheyenne Mckinnon (RN)

## 2019-10-16 NOTE — PROGRESS NOTE ADULT - SUBJECTIVE AND OBJECTIVE BOX
[   ] ICU                                          [   ] CCU                                      [  x ] Medical Floor    Patient is comfortable. No new complaints reported, No abdominal pain, N/V, hematemesis, hematochezia, melena, fever, chills, chest pain, SOB, cough or diarrhea reported.    VITALS  Vital Signs Last 24 Hrs  T(C): 36.4 (16 Oct 2019 13:06), Max: 36.7 (16 Oct 2019 05:18)  T(F): 97.6 (16 Oct 2019 13:06), Max: 98.1 (16 Oct 2019 05:18)  HR: 66 (16 Oct 2019 13:06) (57 - 66)  BP: 121/58 (16 Oct 2019 13:06) (106/61 - 121/58)   RR: 16 (16 Oct 2019 13:06) (16 - 16)  SpO2: 98% (16 Oct 2019 13:06) (98% - 99%)       MEDICATIONS  (STANDING):  atorvastatin 20 milliGRAM(s) Oral at bedtime  ciprofloxacin   IVPB 400 milliGRAM(s) IV Intermittent every 12 hours  ciprofloxacin   IVPB      dextrose 5% + sodium chloride 0.9%. 1000 milliLiter(s) (80 mL/Hr) IV Continuous <Continuous>  dextrose 50% Injectable 25 Gram(s) IV Push once  enoxaparin Injectable 40 milliGRAM(s) SubCutaneous daily  influenza   Vaccine 0.5 milliLiter(s) IntraMuscular once  insulin lispro (HumaLOG) corrective regimen sliding scale   SubCutaneous Before meals and at bedtime  levothyroxine 100 MICROGram(s) Oral daily  losartan 50 milliGRAM(s) Oral daily  metroNIDAZOLE  IVPB 500 milliGRAM(s) IV Intermittent every 8 hours  metroNIDAZOLE  IVPB        MEDICATIONS  (PRN):  acetaminophen   Tablet .. 650 milliGRAM(s) Oral every 6 hours PRN Temp greater or equal to 38C (100.4F), Mild Pain (1 - 3)  ketorolac   Injectable 15 milliGRAM(s) IV Push every 8 hours PRN Moderate Pain (4 - 6)  ondansetron Injectable 4 milliGRAM(s) IV Push every 6 hours PRN Nausea and/or Vomiting                            12.3   5.53  )-----------( 356      ( 15 Oct 2019 06:27 )             39.1       10-15    139  |  107  |  7   ----------------------------<  104<H>  4.2   |  28  |  0.75    Ca    9.2      15 Oct 2019 06:27

## 2019-10-16 NOTE — PROGRESS NOTE ADULT - PROBLEM SELECTOR PLAN 1
WBC 5.3  Continue Cipro and Flagyl  diet advanced to full fluids for lunch  Surgical consult Dr Rice  GI Dr Lin  Colonoscopy in 6-8 weeks as outpt
Incentive spirometry   Bronchodilators  Chest Pt

## 2019-10-16 NOTE — PROGRESS NOTE ADULT - PROBLEM SELECTOR PLAN 4
Monitor BP  Cont meds
BP well controlled  Continue Losartan
Monitor BP  Cont meds
Monitor BP  Cont meds

## 2019-10-16 NOTE — PROGRESS NOTE ADULT - NEGATIVE MUSCULOSKELETAL SYMPTOMS
no stiffness/no muscle weakness/no muscle cramps/no neck pain
no stiffness/no neck pain/no muscle cramps/no muscle weakness
no stiffness/no neck pain/no muscle cramps/no muscle weakness

## 2019-10-16 NOTE — PROGRESS NOTE ADULT - NEGATIVE RESPIRATORY AND THORAX SYMPTOMS
no cough/no wheezing/no hemoptysis/no dyspnea
no hemoptysis/no dyspnea/no cough/no wheezing
no dyspnea/no cough/no hemoptysis/no wheezing

## 2019-10-16 NOTE — PROGRESS NOTE ADULT - CONSTITUTIONAL DETAILS
well-nourished/well-developed/well-groomed/no distress
no distress/well-nourished/well-developed/well-groomed
well-groomed/well-nourished/no distress/well-developed

## 2019-10-16 NOTE — PROGRESS NOTE ADULT - NEGATIVE CARDIOVASCULAR SYMPTOMS
no palpitations/no orthopnea/no chest pain
no chest pain/no palpitations/no orthopnea
no palpitations/no orthopnea/no chest pain

## 2019-10-16 NOTE — PROGRESS NOTE ADULT - NEGATIVE ENMT SYMPTOMS
no ear pain/no vertigo/no gum bleeding/no throat pain/no dysphagia/no hearing difficulty/no nose bleeds/no dry mouth
no dry mouth/no nose bleeds/no gum bleeding/no throat pain/no dysphagia/no vertigo/no hearing difficulty/no ear pain
no gum bleeding/no dry mouth/no nose bleeds/no throat pain/no dysphagia/no hearing difficulty/no ear pain/no vertigo

## 2019-10-16 NOTE — PROGRESS NOTE ADULT - NEGATIVE NEUROLOGICAL SYMPTOMS
no difficulty walking/no syncope/no headache/no confusion/no vertigo/no loss of consciousness/no tremors/no loss of sensation
no loss of sensation/no difficulty walking/no loss of consciousness/no tremors/no syncope/no vertigo/no headache/no confusion
no loss of consciousness/no vertigo/no loss of sensation/no syncope/no tremors/no headache/no difficulty walking/no confusion

## 2019-10-16 NOTE — PROGRESS NOTE ADULT - ASSESSMENT
63 y/o Female w/ acute onset of diverticulitis; Afebrile, leukocytosis normalized     -Advance diet as tolerated   -C/w IV abx   -Pain medication PRN   -Care as per medical team  -Outpatient follow up with Dr Rice
1. Abdominal pain  2. Acute diverticulitis  3. Constipation    Suggestions:    1. Antibiotics IV  2. Advance diet as tolerated  3. Protonix daily  4. Monitor electrolytes  5. Surgical evaluation  6. Colonoscopy in 6-8 weeks out patient  7. DVT prophylaxis

## 2019-10-16 NOTE — PROGRESS NOTE ADULT - NEUROLOGICAL DETAILS
responds to pain/sensation intact/responds to verbal commands
responds to pain/responds to verbal commands/sensation intact/cranial nerves intact/alert and oriented x 3

## 2019-10-16 NOTE — PROGRESS NOTE ADULT - PROBLEM SELECTOR PLAN 3
A1C 6.3   Continue POCT , HSS and consistent carb diet
Accuchecks with reg insulin coverage   HbA1c

## 2019-10-16 NOTE — PROGRESS NOTE ADULT - PROBLEM SELECTOR PLAN 2
Antibiotics  GI consult noted   Recc Colonoscopy in 6-8 wk as OP.   Pain control  Advance diet as tolerated.

## 2019-10-16 NOTE — PROGRESS NOTE ADULT - NEGATIVE GASTROINTESTINAL SYMPTOMS
no hematochezia/no nausea/no diarrhea/no vomiting/no melena/no jaundice
no nausea/no diarrhea/no vomiting/no melena/no hematochezia/no jaundice
no melena/no jaundice/no hematochezia/no diarrhea/no vomiting/no nausea

## 2019-10-16 NOTE — PROGRESS NOTE ADULT - PROVIDER SPECIALTY LIST ADULT
Gastroenterology
Internal Medicine
Pulmonology
Pulmonology
Surgery
Pulmonology
Internal Medicine
Gastroenterology
Gastroenterology

## 2019-10-17 PROBLEM — Z00.00 ENCOUNTER FOR PREVENTIVE HEALTH EXAMINATION: Status: ACTIVE | Noted: 2019-10-17

## 2019-10-18 LAB
CULTURE RESULTS: SIGNIFICANT CHANGE UP
CULTURE RESULTS: SIGNIFICANT CHANGE UP
SPECIMEN SOURCE: SIGNIFICANT CHANGE UP
SPECIMEN SOURCE: SIGNIFICANT CHANGE UP

## 2021-08-11 NOTE — CONSULT NOTE ADULT - NEUROLOGICAL
[FreeTextEntry1] : \par This visit was provided via telehealth using real-time 2-way audio visual technology. The patient, ELLI LOVELL, was located at home, [address] [place] [address] , at the time of the visit. The provider was located at [address] [place] [address]. The patient, ELLI LOVELL, Dr. Josh Watkins and SHAHID HUTCHISON all participated in the telehealth encounter. 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 She is referred by Dr. Celso Calix. \par \par Patient reports progressive dyspnea on exertion over the last 2-3 months. She is able to walk 1-2 blocks before having to stop and rest. She also reports chest pain (w/ activity and at rest), palpitations and fatigue (NYHA III). 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  detailed exam details…

## 2021-12-01 ENCOUNTER — EMERGENCY (EMERGENCY)
Facility: HOSPITAL | Age: 66
LOS: 1 days | Discharge: HOME | End: 2021-12-01
Attending: EMERGENCY MEDICINE | Admitting: EMERGENCY MEDICINE
Payer: COMMERCIAL

## 2021-12-01 VITALS
HEART RATE: 72 BPM | OXYGEN SATURATION: 96 % | SYSTOLIC BLOOD PRESSURE: 154 MMHG | HEIGHT: 62 IN | TEMPERATURE: 99 F | WEIGHT: 139.99 LBS | DIASTOLIC BLOOD PRESSURE: 76 MMHG | RESPIRATION RATE: 16 BRPM

## 2021-12-01 DIAGNOSIS — Z98.51 TUBAL LIGATION STATUS: Chronic | ICD-10-CM

## 2021-12-01 DIAGNOSIS — Z41.1 ENCOUNTER FOR COSMETIC SURGERY: Chronic | ICD-10-CM

## 2021-12-01 LAB — SARS-COV-2 RNA SPEC QL NAA+PROBE: SIGNIFICANT CHANGE UP

## 2021-12-01 PROCEDURE — 87635 SARS-COV-2 COVID-19 AMP PRB: CPT

## 2021-12-01 PROCEDURE — 99283 EMERGENCY DEPT VISIT LOW MDM: CPT

## 2021-12-01 PROCEDURE — 99282 EMERGENCY DEPT VISIT SF MDM: CPT

## 2021-12-01 NOTE — ED PROVIDER NOTE - OBJECTIVE STATEMENT
66 y.o female with PMHx of htn and diabetes was exposed to a child with covid-19 last week. Patient is asymptomatic and wants a covid test.

## 2021-12-01 NOTE — ED PROVIDER NOTE - PATIENT PORTAL LINK FT
You can access the FollowMyHealth Patient Portal offered by [facility] by registering at the following website: [email or web address]. By joining [employer]’s FollowMyHealth portal, you will also be able to view your health information using other applications (apps) compatible with our system.

## 2021-12-01 NOTE — ED PROVIDER NOTE - CLINICAL SUMMARY MEDICAL DECISION MAKING FREE TEXT BOX
Covid swab. Advised patient regarding potential delay exposure from positive test. Covid swab. Advised patient regarding potential delay exposure from positive test. Repeat testing advised serially.

## 2023-12-04 ENCOUNTER — APPOINTMENT (OUTPATIENT)
Age: 68
End: 2023-12-04
Payer: COMMERCIAL

## 2023-12-04 VITALS
SYSTOLIC BLOOD PRESSURE: 148 MMHG | WEIGHT: 151 LBS | DIASTOLIC BLOOD PRESSURE: 89 MMHG | OXYGEN SATURATION: 96 % | HEART RATE: 72 BPM | BODY MASS INDEX: 27.79 KG/M2 | HEIGHT: 62 IN

## 2023-12-04 DIAGNOSIS — M25.562 PAIN IN LEFT KNEE: ICD-10-CM

## 2023-12-04 PROCEDURE — 73564 X-RAY EXAM KNEE 4 OR MORE: CPT | Mod: LT

## 2023-12-04 PROCEDURE — 99204 OFFICE O/P NEW MOD 45 MIN: CPT | Mod: 25

## 2023-12-04 PROCEDURE — 20610 DRAIN/INJ JOINT/BURSA W/O US: CPT | Mod: LT

## 2024-02-05 ENCOUNTER — APPOINTMENT (OUTPATIENT)
Age: 69
End: 2024-02-05
Payer: COMMERCIAL

## 2024-02-05 VITALS
SYSTOLIC BLOOD PRESSURE: 149 MMHG | BODY MASS INDEX: 28.52 KG/M2 | HEIGHT: 62 IN | OXYGEN SATURATION: 95 % | HEART RATE: 84 BPM | DIASTOLIC BLOOD PRESSURE: 84 MMHG | WEIGHT: 155 LBS

## 2024-02-05 DIAGNOSIS — S76.312A STRAIN OF MUSCLE, FASCIA AND TENDON OF THE POSTERIOR MUSCLE GROUP AT THIGH LEVEL, LEFT THIGH, INITIAL ENCOUNTER: ICD-10-CM

## 2024-02-05 PROCEDURE — 99213 OFFICE O/P EST LOW 20 MIN: CPT

## 2024-02-07 NOTE — HISTORY OF PRESENT ILLNESS
[de-identified] : Patient is status post left knee steroid injection.  The steroid injection did help significantly with her knee pain.  However now she is having more lateral knee pain.

## 2024-02-07 NOTE — ASSESSMENT
[FreeTextEntry1] : 68-year-old female left knee osteoarthritis status post steroid injection. - Previous knee pain significantly improved however now she is complaining of lateral based knee pain.  On exam she has tenderness over the distal portion of the hamstring tendons.  That is where she is most tender.  There is on the lateral aspect of her knee.  Patient has been able to ambulate more since injection.  Likely has a left hamstring strain.  Recommend physical therapy.  Physical therapy referral was placed.  We have the patient follow-up in 2 months for reassessment.  Also recommend anti-inflammatories

## 2024-03-06 ENCOUNTER — APPOINTMENT (OUTPATIENT)
Age: 69
End: 2024-03-06
Payer: COMMERCIAL

## 2024-03-06 VITALS
HEIGHT: 62 IN | WEIGHT: 155 LBS | OXYGEN SATURATION: 94 % | SYSTOLIC BLOOD PRESSURE: 133 MMHG | BODY MASS INDEX: 28.52 KG/M2 | HEART RATE: 83 BPM | DIASTOLIC BLOOD PRESSURE: 85 MMHG

## 2024-03-06 DIAGNOSIS — M17.12 UNILATERAL PRIMARY OSTEOARTHRITIS, LEFT KNEE: ICD-10-CM

## 2024-03-06 PROCEDURE — 99213 OFFICE O/P EST LOW 20 MIN: CPT

## 2024-03-06 NOTE — HISTORY OF PRESENT ILLNESS
[de-identified] : Overall patient doing well.  Her left knee pain is minimal.  She does have increased pain in night after prolonged activity however the pain is improved with Bengay.  Overall she is happy with the steroid injection

## 2024-03-06 NOTE — PHYSICAL EXAM
[de-identified] : Left lower extremity - Skin intact - Knee range of motion 0-120 - Sensation intact light touch distally - Leg warm well-perfused

## 2024-03-06 NOTE — ASSESSMENT
[FreeTextEntry1] : 68-year-old female left knee osteoarthritis status post steroid injection. -Overall patient is doing very well.  She is able to exercise.  She likes to do walking which she is able to do now.  She does have some pain at night but will take Tylenol or anti-inflammatories depending on the pain level.  She also uses Bengay and Voltaren gel for pain.  She is happy with her overall pain level.  Discussed with patient that I would continue the over-the-counter pain medications as needed.  Will have the patient follow-up in 6 months.

## 2024-04-05 NOTE — H&P ADULT - NSICDXFAMHXPERTINENTNEGATIVE_GEN_A_CORE_FT
-- DO NOT REPLY / DO NOT REPLY ALL --  -- Message is from [facility] [facility] [employer] ([facility]) --    General Patient Message: Patient would like to discuss the new medication that wasn't called into the pharmacy yet.     Caller Information         Type Contact Phone/Fax    04/05/2024 02:56 PM CDT Phone (Incoming) Marycarmen Paredes (Self) [phone] (M)          Alternative phone number: no    Can a detailed message be left? Yes    Message Turnaround:                -

## 2024-09-11 ENCOUNTER — APPOINTMENT (OUTPATIENT)
Age: 69
End: 2024-09-11

## 2025-04-05 ENCOUNTER — EMERGENCY (EMERGENCY)
Facility: HOSPITAL | Age: 70
LOS: 1 days | End: 2025-04-05
Attending: STUDENT IN AN ORGANIZED HEALTH CARE EDUCATION/TRAINING PROGRAM
Payer: COMMERCIAL

## 2025-04-05 VITALS
TEMPERATURE: 98 F | SYSTOLIC BLOOD PRESSURE: 134 MMHG | HEART RATE: 72 BPM | DIASTOLIC BLOOD PRESSURE: 78 MMHG | RESPIRATION RATE: 17 BRPM | OXYGEN SATURATION: 97 %

## 2025-04-05 VITALS
DIASTOLIC BLOOD PRESSURE: 80 MMHG | OXYGEN SATURATION: 97 % | HEIGHT: 63 IN | HEART RATE: 67 BPM | TEMPERATURE: 98 F | WEIGHT: 147.71 LBS | RESPIRATION RATE: 16 BRPM | SYSTOLIC BLOOD PRESSURE: 146 MMHG

## 2025-04-05 DIAGNOSIS — Z41.1 ENCOUNTER FOR COSMETIC SURGERY: Chronic | ICD-10-CM

## 2025-04-05 DIAGNOSIS — Z98.51 TUBAL LIGATION STATUS: Chronic | ICD-10-CM

## 2025-04-05 LAB
ALBUMIN SERPL ELPH-MCNC: 3.7 G/DL — SIGNIFICANT CHANGE UP (ref 3.5–5)
ALP SERPL-CCNC: 91 U/L — SIGNIFICANT CHANGE UP (ref 40–120)
ALT FLD-CCNC: 23 U/L DA — SIGNIFICANT CHANGE UP (ref 10–60)
ANION GAP SERPL CALC-SCNC: 5 MMOL/L — SIGNIFICANT CHANGE UP (ref 5–17)
AST SERPL-CCNC: 21 U/L — SIGNIFICANT CHANGE UP (ref 10–40)
BASOPHILS # BLD AUTO: 0.05 K/UL — SIGNIFICANT CHANGE UP (ref 0–0.2)
BASOPHILS NFR BLD AUTO: 0.9 % — SIGNIFICANT CHANGE UP (ref 0–2)
BILIRUB SERPL-MCNC: 0.6 MG/DL — SIGNIFICANT CHANGE UP (ref 0.2–1.2)
BUN SERPL-MCNC: 9 MG/DL — SIGNIFICANT CHANGE UP (ref 7–18)
CALCIUM SERPL-MCNC: 9.3 MG/DL — SIGNIFICANT CHANGE UP (ref 8.4–10.5)
CHLORIDE SERPL-SCNC: 106 MMOL/L — SIGNIFICANT CHANGE UP (ref 96–108)
CO2 SERPL-SCNC: 27 MMOL/L — SIGNIFICANT CHANGE UP (ref 22–31)
CREAT SERPL-MCNC: 0.64 MG/DL — SIGNIFICANT CHANGE UP (ref 0.5–1.3)
EGFR: 94 ML/MIN/1.73M2 — SIGNIFICANT CHANGE UP
EGFR: 94 ML/MIN/1.73M2 — SIGNIFICANT CHANGE UP
EOSINOPHIL # BLD AUTO: 0.24 K/UL — SIGNIFICANT CHANGE UP (ref 0–0.5)
EOSINOPHIL NFR BLD AUTO: 4.4 % — SIGNIFICANT CHANGE UP (ref 0–6)
GLUCOSE SERPL-MCNC: 103 MG/DL — HIGH (ref 70–99)
HCT VFR BLD CALC: 39.1 % — SIGNIFICANT CHANGE UP (ref 34.5–45)
HGB BLD-MCNC: 13.1 G/DL — SIGNIFICANT CHANGE UP (ref 11.5–15.5)
IMM GRANULOCYTES NFR BLD AUTO: 0.4 % — SIGNIFICANT CHANGE UP (ref 0–0.9)
LACTATE SERPL-SCNC: 0.9 MMOL/L — SIGNIFICANT CHANGE UP (ref 0.7–2)
LIDOCAIN IGE QN: 22 U/L — SIGNIFICANT CHANGE UP (ref 13–75)
LYMPHOCYTES # BLD AUTO: 1.27 K/UL — SIGNIFICANT CHANGE UP (ref 1–3.3)
LYMPHOCYTES # BLD AUTO: 23.3 % — SIGNIFICANT CHANGE UP (ref 13–44)
MAGNESIUM SERPL-MCNC: 2.3 MG/DL — SIGNIFICANT CHANGE UP (ref 1.6–2.6)
MCHC RBC-ENTMCNC: 29.3 PG — SIGNIFICANT CHANGE UP (ref 27–34)
MCHC RBC-ENTMCNC: 33.5 G/DL — SIGNIFICANT CHANGE UP (ref 32–36)
MCV RBC AUTO: 87.5 FL — SIGNIFICANT CHANGE UP (ref 80–100)
MONOCYTES # BLD AUTO: 0.47 K/UL — SIGNIFICANT CHANGE UP (ref 0–0.9)
MONOCYTES NFR BLD AUTO: 8.6 % — SIGNIFICANT CHANGE UP (ref 2–14)
NEUTROPHILS # BLD AUTO: 3.41 K/UL — SIGNIFICANT CHANGE UP (ref 1.8–7.4)
NEUTROPHILS NFR BLD AUTO: 62.4 % — SIGNIFICANT CHANGE UP (ref 43–77)
NRBC BLD AUTO-RTO: 0 /100 WBCS — SIGNIFICANT CHANGE UP (ref 0–0)
PLATELET # BLD AUTO: 327 K/UL — SIGNIFICANT CHANGE UP (ref 150–400)
POTASSIUM SERPL-MCNC: 4.1 MMOL/L — SIGNIFICANT CHANGE UP (ref 3.5–5.3)
POTASSIUM SERPL-SCNC: 4.1 MMOL/L — SIGNIFICANT CHANGE UP (ref 3.5–5.3)
PROT SERPL-MCNC: 7.4 G/DL — SIGNIFICANT CHANGE UP (ref 6–8.3)
RBC # BLD: 4.47 M/UL — SIGNIFICANT CHANGE UP (ref 3.8–5.2)
RBC # FLD: 12.8 % — SIGNIFICANT CHANGE UP (ref 10.3–14.5)
SODIUM SERPL-SCNC: 138 MMOL/L — SIGNIFICANT CHANGE UP (ref 135–145)
WBC # BLD: 5.46 K/UL — SIGNIFICANT CHANGE UP (ref 3.8–10.5)
WBC # FLD AUTO: 5.46 K/UL — SIGNIFICANT CHANGE UP (ref 3.8–10.5)

## 2025-04-05 PROCEDURE — 74177 CT ABD & PELVIS W/CONTRAST: CPT | Mod: 26

## 2025-04-05 PROCEDURE — 99285 EMERGENCY DEPT VISIT HI MDM: CPT

## 2025-04-05 PROCEDURE — 83735 ASSAY OF MAGNESIUM: CPT

## 2025-04-05 PROCEDURE — 99284 EMERGENCY DEPT VISIT MOD MDM: CPT | Mod: 25

## 2025-04-05 PROCEDURE — 96375 TX/PRO/DX INJ NEW DRUG ADDON: CPT

## 2025-04-05 PROCEDURE — 74177 CT ABD & PELVIS W/CONTRAST: CPT | Mod: MC

## 2025-04-05 PROCEDURE — 83690 ASSAY OF LIPASE: CPT

## 2025-04-05 PROCEDURE — 85025 COMPLETE CBC W/AUTO DIFF WBC: CPT

## 2025-04-05 PROCEDURE — 96374 THER/PROPH/DIAG INJ IV PUSH: CPT | Mod: XU

## 2025-04-05 PROCEDURE — 83605 ASSAY OF LACTIC ACID: CPT

## 2025-04-05 PROCEDURE — 36415 COLL VENOUS BLD VENIPUNCTURE: CPT

## 2025-04-05 PROCEDURE — 80053 COMPREHEN METABOLIC PANEL: CPT

## 2025-04-05 RX ORDER — ACETAMINOPHEN 500 MG/5ML
1000 LIQUID (ML) ORAL ONCE
Refills: 0 | Status: COMPLETED | OUTPATIENT
Start: 2025-04-05 | End: 2025-04-05

## 2025-04-05 RX ADMIN — Medication 20 MILLIGRAM(S): at 14:29

## 2025-04-05 RX ADMIN — Medication 1000 MILLIGRAM(S): at 15:34

## 2025-04-05 RX ADMIN — Medication 400 MILLIGRAM(S): at 14:34

## 2025-04-05 RX ADMIN — Medication 1000 MILLIGRAM(S): at 14:49

## 2025-04-05 NOTE — ED PROVIDER NOTE - NSFOLLOWUPINSTRUCTIONS_ED_ALL_ED_FT
Epiploic Appendagitis    WHAT YOU NEED TO KNOW:    What is epiploic appendagitis? Epiploic appendagitis is a condition that causes severe abdominal pain. The condition develops when blood cannot flow to small pouches of fat on your large intestine.  Abdominal Organs    What increases my risk for epiploic appendagitis?    Being male    Obesity    Strenuous exercise    An abdominal hernia    A condition such as appendicitis (inflamed appendix) or diverticulitis (inflamed pockets in the colon)  What are the signs and symptoms of epiploic appendagitis?    Pain, usually on the lower left side of the abdomen that may come and go    Pain that is worse when you cough or breathe deeply    An area of the abdomen that is tender to the touch    Rarely, fever, nausea, vomiting, diarrhea, or loss of appetite  What are the types of epiploic appendagitis?    Primary means the blood supply to the pouches is cut off. This may be from a twisted area that pinches off blood vessels. Blood vessels may instead collapse or get a blood clot. These block the blood flow to the pouches.    Secondary means the colon or tissues around it are infected. Common causes include appendicitis or diverticulitis. Inflammation changes the blood flow and causes pain.  How is epiploic appendagitis diagnosed? Your healthcare provider will examine you and ask about your symptoms. Tell your provider when symptoms began. Describe anything that makes your symptoms better or worse. Your provider may use certain tests to rule out other conditions, such as appendicitis or diverticulitis. You may need any of the following:    Blood tests may be used to rule out other causes of your pain.    CT scan pictures will show certain signs, such as a thicker bowel wall than normal. Contrast liquid may be used to help the area show up better in pictures. Tell the healthcare provider if you have ever had an allergic reaction to contrast liquid.  How is epiploic appendagitis treated? Epiploic appendagitis usually goes away without treatment. You may need any of the following if your pain continues:    Medicines may be given to treat pain or a bacterial infection.    Surgery may be needed to treat certain causes. You may need to have your appendix removed, for example. Surgery may also be needed if your symptoms become severe, continue, or happen often.  What can I do to manage or prevent epiploic appendagitis?    Manage your weight. Ask your healthcare provider what a healthy weight is for you. Your provider can help you create a safe weight-loss plan, if needed.    Eat a variety of healthy foods. Healthy foods include vegetables, fruit, low-fat dairy products, lean meats, fish, whole-grain breads and cereals, nuts, and cooked beans.  Healthy Foods      Use portion control for food. Eat several small meals during the day instead of 3 large meals. Try not to eat large amounts of food at 1 time. Your healthcare provider or a dietitian can help you create healthy meal plans, if needed. Your provider can help you understand the correct portion sizes for each food.  Portion Sizes for Adults      Be active throughout the day. Physical activity, such as exercise, can help you manage your weight. Physical activity can also help digestion. Your healthcare provider can help you create a physical activity plan. Try to stay active during the day. Even a few minutes of physical activity several times during the day will help.  Ways to Be Physically Active  When should I call my doctor?    You have a fever.    Your pain continues longer than 1 week.    You are vomiting and cannot keep food down.    You have chills, a cough, or feel weak and achy.    You have trouble having a bowel movement, or you have diarrhea.    You have questions or concerns about your condition or care.  CARE AGREEMENT:    You have the right to help plan your care. Learn about your health condition and how it may be treated. Discuss treatment options with your healthcare providers to decide what care you want to receive. You always have the right to refuse treatment.    © [employer] 1973, 2025

## 2025-04-05 NOTE — ED PROVIDER NOTE - OBJECTIVE STATEMENT
70-year-old female, PMH of HTN,   DM, diverticulitis, presenting with 2 to 3 days of abdominal pain.  Patient was advised family member at bedside.  Patient has had some diarrhea as well as nausea but no vomiting.  Has had decreased appetite since the symptoms began.  Denies any history of abdominal surgeries.

## 2025-04-05 NOTE — ED ADULT TRIAGE NOTE - AVIAN FLU SYMPTOMS
Wound site is healing well. Pt advised to watch for increased redness, swelling, oozing or fever. Pt verbalized understanding.    No

## 2025-04-05 NOTE — ED PROVIDER NOTE - CLINICAL SUMMARY MEDICAL DECISION MAKING FREE TEXT BOX
70-year-old male presenting with abdominal pain and diarrhea.  Patient with diffuse abdominal tenderness but worse in the right upper quadrant and epigastrium as well as right lower quadrant.  Concern for appendicitis versus diverticulitis versus pancreatitis versus cholecystitis versus gastritis.  Will obtain labs, CT abdomen/pelvis to assess.

## 2025-04-05 NOTE — ED PROVIDER NOTE - PATIENT PORTAL LINK FT
You can access the FollowMyHealth Patient Portal offered by [facility] by registering at the following website: [email or web address]. By joining [employer]’s FollowMyHealth portal, you will also be able to view your health information using other applications (apps) compatible with our system.

## 2025-04-05 NOTE — ED ADULT NURSE NOTE - FINAL NURSING ELECTRONIC SIGNATURE
"Goal Outcome Evaluation:   Clinical swallow evaluation completed. Pt was awake and alert, able to follow commands, moderately confused. Upon room entry, pt lying in bed awake. Room air. Pt was on a regular diet and thin liquids at the time of evaluation. Pt is edentulous and reported she wears dentures at her facility. Pt reported she is on a \"softer diet and thin liquids at her facility and denied swallowing difficulty. Oral motor exam revealed generalized oral motor weakness.Trials assessed: ice chips x2, thin by cup x2, thin by straw x2, puree x2, & mixed/mech soft x1. Pt had adequate bolus control of ice chip and and able to self-feed. No anterior loss during trials. Extended mastication for mixed/mech. There was no pocketing or residue. Pt demonstrated weak cough x1, not during P.O intake. SLP suspecting from influenza. No other s/s of penetration/aspiration noted during evaluation. Pt agreeable to puree diet and this time. Per above, pt presents w/ mild-moderate oral dysphagia and not suspecting pharyngeal phase dysphagia. It is recommended pt initiate a puree diet and thin liquids. Only feed when pt is fully awake and alert. Meds crushed in puree.     SLP Plan & Recommendation:      - Puree diet and thin liquids  - Only feed when pt is fully awake and alert  - Meds: crushed in puree  - Safe swallow strategies: HOB at a 90 degree angle, slow rate of intake, small bites/sips  - ST will continue to complete meal assessment(s) to ensure tolerance and safety of rec'd diet, provide further recs as indicated.     SLP Swallowing Diagnosis: oral dysphagia, mild-moderate (02/22/25 1200)        Plan for Continued Treatment (SLP): continue treatment per plan of care (02/22/25 1200)    " 05-Apr-2025 17:25

## 2025-04-05 NOTE — ED ADULT NURSE NOTE - NSFALLUNIVINTERV_ED_ALL_ED
Bed/Stretcher in lowest position, wheels locked, appropriate side rails in place/Call bell, personal items and telephone in reach/Instruct patient to call for assistance before getting out of bed/chair/stretcher/Non-slip footwear applied when patient is off stretcher/[place] to call system/Physically safe environment - no spills, clutter or unnecessary equipment/Purposeful proactive rounding/Room/bathroom lighting operational, light cord in reach

## 2025-04-05 NOTE — ED PROVIDER NOTE - PROGRESS NOTE DETAILS
CT compatible with acute epiploic appendagitis.  All results discussed with patient and  at bedside.  Will DC with supportive care.

## 2025-04-05 NOTE — ED PROVIDER NOTE - PHYSICAL EXAMINATION
General: well appearing female, no acute distress   HEENT: normocephalic, atraumatic   Respiratory: normal work of breathing  Cardiac: regular rate and rhythm   Abdomen: soft, diffuse tenderness to palpation    MSK: no swelling or tenderness of lower extremities, moving all extremities spontaneously   Skin: warm, dry   Neuro: A&Ox3  Psych: appropriate affect